# Patient Record
Sex: FEMALE | Race: WHITE | NOT HISPANIC OR LATINO | URBAN - METROPOLITAN AREA
[De-identification: names, ages, dates, MRNs, and addresses within clinical notes are randomized per-mention and may not be internally consistent; named-entity substitution may affect disease eponyms.]

---

## 2017-01-12 ENCOUNTER — COMMUNICATION - HEALTHEAST (OUTPATIENT)
Dept: INTERNAL MEDICINE | Facility: CLINIC | Age: 82
End: 2017-01-12

## 2017-05-26 ENCOUNTER — RECORDS - HEALTHEAST (OUTPATIENT)
Dept: ADMINISTRATIVE | Facility: OTHER | Age: 82
End: 2017-05-26

## 2017-08-21 ENCOUNTER — COMMUNICATION - HEALTHEAST (OUTPATIENT)
Dept: INTERNAL MEDICINE | Facility: CLINIC | Age: 82
End: 2017-08-21

## 2017-08-22 ENCOUNTER — OFFICE VISIT - HEALTHEAST (OUTPATIENT)
Dept: INTERNAL MEDICINE | Facility: CLINIC | Age: 82
End: 2017-08-22

## 2017-08-22 DIAGNOSIS — J02.9 ACUTE PHARYNGITIS, UNSPECIFIED ETIOLOGY: ICD-10-CM

## 2017-08-22 ASSESSMENT — MIFFLIN-ST. JEOR: SCORE: 734.74

## 2017-12-21 ENCOUNTER — RECORDS - HEALTHEAST (OUTPATIENT)
Dept: ADMINISTRATIVE | Facility: OTHER | Age: 82
End: 2017-12-21

## 2018-06-07 ENCOUNTER — RECORDS - HEALTHEAST (OUTPATIENT)
Dept: GENERAL RADIOLOGY | Facility: CLINIC | Age: 83
End: 2018-06-07

## 2018-06-07 ENCOUNTER — OFFICE VISIT - HEALTHEAST (OUTPATIENT)
Dept: FAMILY MEDICINE | Facility: CLINIC | Age: 83
End: 2018-06-07

## 2018-06-07 DIAGNOSIS — M25.571 RIGHT ANKLE PAIN: ICD-10-CM

## 2018-06-07 DIAGNOSIS — M25.571 PAIN IN RIGHT ANKLE AND JOINTS OF RIGHT FOOT: ICD-10-CM

## 2018-06-07 DIAGNOSIS — R09.A2 GLOBUS SENSATION: ICD-10-CM

## 2018-06-13 ENCOUNTER — RECORDS - HEALTHEAST (OUTPATIENT)
Dept: ADMINISTRATIVE | Facility: OTHER | Age: 83
End: 2018-06-13

## 2018-06-19 ENCOUNTER — RECORDS - HEALTHEAST (OUTPATIENT)
Dept: ADMINISTRATIVE | Facility: OTHER | Age: 83
End: 2018-06-19

## 2018-06-28 ENCOUNTER — AMBULATORY - HEALTHEAST (OUTPATIENT)
Dept: INTERNAL MEDICINE | Facility: CLINIC | Age: 83
End: 2018-06-28

## 2018-06-28 ENCOUNTER — COMMUNICATION - HEALTHEAST (OUTPATIENT)
Dept: INTERNAL MEDICINE | Facility: CLINIC | Age: 83
End: 2018-06-28

## 2018-06-28 DIAGNOSIS — Z86.73 HISTORY OF TIA (TRANSIENT ISCHEMIC ATTACK) AND STROKE: ICD-10-CM

## 2018-07-03 ENCOUNTER — RECORDS - HEALTHEAST (OUTPATIENT)
Dept: ADMINISTRATIVE | Facility: OTHER | Age: 83
End: 2018-07-03

## 2018-07-07 ENCOUNTER — COMMUNICATION - HEALTHEAST (OUTPATIENT)
Dept: INTERNAL MEDICINE | Facility: CLINIC | Age: 83
End: 2018-07-07

## 2018-07-18 ENCOUNTER — RECORDS - HEALTHEAST (OUTPATIENT)
Dept: ADMINISTRATIVE | Facility: OTHER | Age: 83
End: 2018-07-18

## 2018-07-19 ENCOUNTER — RECORDS - HEALTHEAST (OUTPATIENT)
Dept: ADMINISTRATIVE | Facility: OTHER | Age: 83
End: 2018-07-19

## 2018-07-30 ENCOUNTER — RECORDS - HEALTHEAST (OUTPATIENT)
Dept: ADMINISTRATIVE | Facility: OTHER | Age: 83
End: 2018-07-30

## 2018-08-06 ENCOUNTER — COMMUNICATION - HEALTHEAST (OUTPATIENT)
Dept: INTERNAL MEDICINE | Facility: CLINIC | Age: 83
End: 2018-08-06

## 2018-08-08 ENCOUNTER — OFFICE VISIT - HEALTHEAST (OUTPATIENT)
Dept: INTERNAL MEDICINE | Facility: CLINIC | Age: 83
End: 2018-08-08

## 2018-08-08 DIAGNOSIS — G43.109 MIGRAINE WITH AURA AND WITHOUT STATUS MIGRAINOSUS, NOT INTRACTABLE: ICD-10-CM

## 2018-08-08 ASSESSMENT — MIFFLIN-ST. JEOR: SCORE: 768.76

## 2018-09-04 ENCOUNTER — RECORDS - HEALTHEAST (OUTPATIENT)
Dept: ADMINISTRATIVE | Facility: OTHER | Age: 83
End: 2018-09-04

## 2019-05-30 ENCOUNTER — RECORDS - HEALTHEAST (OUTPATIENT)
Dept: ADMINISTRATIVE | Facility: OTHER | Age: 84
End: 2019-05-30

## 2019-06-06 ENCOUNTER — RECORDS - HEALTHEAST (OUTPATIENT)
Dept: ADMINISTRATIVE | Facility: OTHER | Age: 84
End: 2019-06-06

## 2019-07-18 ENCOUNTER — RECORDS - HEALTHEAST (OUTPATIENT)
Dept: ADMINISTRATIVE | Facility: OTHER | Age: 84
End: 2019-07-18

## 2019-08-01 ENCOUNTER — RECORDS - HEALTHEAST (OUTPATIENT)
Dept: ADMINISTRATIVE | Facility: OTHER | Age: 84
End: 2019-08-01

## 2019-11-29 ENCOUNTER — COMMUNICATION - HEALTHEAST (OUTPATIENT)
Dept: SCHEDULING | Facility: CLINIC | Age: 84
End: 2019-11-29

## 2019-12-02 ENCOUNTER — DOCUMENTATION ONLY (OUTPATIENT)
Dept: OTHER | Facility: CLINIC | Age: 84
End: 2019-12-02

## 2019-12-02 ENCOUNTER — AMBULATORY - HEALTHEAST (OUTPATIENT)
Dept: OTHER | Facility: CLINIC | Age: 84
End: 2019-12-02

## 2019-12-03 ENCOUNTER — COMMUNICATION - HEALTHEAST (OUTPATIENT)
Dept: INTERNAL MEDICINE | Facility: CLINIC | Age: 84
End: 2019-12-03

## 2019-12-06 ENCOUNTER — OFFICE VISIT - HEALTHEAST (OUTPATIENT)
Dept: INTERNAL MEDICINE | Facility: CLINIC | Age: 84
End: 2019-12-06

## 2019-12-06 DIAGNOSIS — A04.72 COLITIS DUE TO CLOSTRIDIUM DIFFICILE: ICD-10-CM

## 2019-12-06 ASSESSMENT — MIFFLIN-ST. JEOR: SCORE: 768.76

## 2019-12-20 ENCOUNTER — COMMUNICATION - HEALTHEAST (OUTPATIENT)
Dept: SCHEDULING | Facility: CLINIC | Age: 84
End: 2019-12-20

## 2019-12-20 ENCOUNTER — RECORDS - HEALTHEAST (OUTPATIENT)
Dept: SCHEDULING | Facility: CLINIC | Age: 84
End: 2019-12-20

## 2019-12-20 ENCOUNTER — AMBULATORY - HEALTHEAST (OUTPATIENT)
Dept: INTERNAL MEDICINE | Facility: CLINIC | Age: 84
End: 2019-12-20

## 2019-12-20 ENCOUNTER — COMMUNICATION - HEALTHEAST (OUTPATIENT)
Dept: INTERNAL MEDICINE | Facility: CLINIC | Age: 84
End: 2019-12-20

## 2019-12-20 DIAGNOSIS — Z00.00 ROUTINE GENERAL MEDICAL EXAMINATION AT A HEALTH CARE FACILITY: ICD-10-CM

## 2019-12-23 ENCOUNTER — AMBULATORY - HEALTHEAST (OUTPATIENT)
Dept: LAB | Facility: CLINIC | Age: 84
End: 2019-12-23

## 2019-12-23 ENCOUNTER — COMMUNICATION - HEALTHEAST (OUTPATIENT)
Dept: SCHEDULING | Facility: CLINIC | Age: 84
End: 2019-12-23

## 2019-12-23 DIAGNOSIS — Z00.00 ROUTINE GENERAL MEDICAL EXAMINATION AT A HEALTH CARE FACILITY: ICD-10-CM

## 2019-12-23 LAB
C DIFF TOX B STL QL: NEGATIVE
RIBOTYPE 027/NAP1/BI: NORMAL

## 2019-12-24 ENCOUNTER — COMMUNICATION - HEALTHEAST (OUTPATIENT)
Dept: INTERNAL MEDICINE | Facility: CLINIC | Age: 84
End: 2019-12-24

## 2020-02-03 ENCOUNTER — OFFICE VISIT - HEALTHEAST (OUTPATIENT)
Dept: INTERNAL MEDICINE | Facility: CLINIC | Age: 85
End: 2020-02-03

## 2020-02-03 DIAGNOSIS — R53.81 DECLINING FUNCTIONAL STATUS: ICD-10-CM

## 2020-02-03 DIAGNOSIS — R63.6 UNDERWEIGHT: ICD-10-CM

## 2020-02-03 DIAGNOSIS — R35.0 URINARY FREQUENCY: ICD-10-CM

## 2020-02-03 DIAGNOSIS — N95.2 ATROPHIC VAGINITIS: ICD-10-CM

## 2020-02-03 DIAGNOSIS — Z91.89 DRIVING SAFETY ISSUE: ICD-10-CM

## 2020-02-03 LAB
ALBUMIN UR-MCNC: NEGATIVE MG/DL
APPEARANCE UR: ABNORMAL
BACTERIA #/AREA URNS HPF: ABNORMAL HPF
BILIRUB UR QL STRIP: NEGATIVE
COLOR UR AUTO: YELLOW
GLUCOSE UR STRIP-MCNC: NEGATIVE MG/DL
HGB UR QL STRIP: ABNORMAL
KETONES UR STRIP-MCNC: NEGATIVE MG/DL
LEUKOCYTE ESTERASE UR QL STRIP: NEGATIVE
NITRATE UR QL: NEGATIVE
PH UR STRIP: 5.5 [PH] (ref 5–8)
RBC #/AREA URNS AUTO: ABNORMAL HPF
SP GR UR STRIP: 1.02 (ref 1–1.03)
SQUAMOUS #/AREA URNS AUTO: ABNORMAL LPF
UROBILINOGEN UR STRIP-ACNC: ABNORMAL
WBC #/AREA URNS AUTO: ABNORMAL HPF

## 2020-02-06 ENCOUNTER — COMMUNICATION - HEALTHEAST (OUTPATIENT)
Dept: INTERNAL MEDICINE | Facility: CLINIC | Age: 85
End: 2020-02-06

## 2020-02-07 ENCOUNTER — OFFICE VISIT - HEALTHEAST (OUTPATIENT)
Dept: INTERNAL MEDICINE | Facility: CLINIC | Age: 85
End: 2020-02-07

## 2020-02-07 DIAGNOSIS — Z86.39 HX OF HYPERLIPIDEMIA: ICD-10-CM

## 2020-02-07 DIAGNOSIS — R63.6 UNDERWEIGHT: ICD-10-CM

## 2020-02-07 DIAGNOSIS — R44.1 VISUAL HALLUCINATIONS: ICD-10-CM

## 2020-02-07 DIAGNOSIS — Z02.2 ENCOUNTER FOR EXAMINATION FOR ADMISSION TO ASSISTED LIVING FACILITY: ICD-10-CM

## 2020-02-10 ENCOUNTER — AMBULATORY - HEALTHEAST (OUTPATIENT)
Dept: NURSING | Facility: CLINIC | Age: 85
End: 2020-02-10

## 2020-02-12 ENCOUNTER — COMMUNICATION - HEALTHEAST (OUTPATIENT)
Dept: INTERNAL MEDICINE | Facility: CLINIC | Age: 85
End: 2020-02-12

## 2020-02-18 ENCOUNTER — COMMUNICATION - HEALTHEAST (OUTPATIENT)
Dept: SCHEDULING | Facility: CLINIC | Age: 85
End: 2020-02-18

## 2020-02-19 ENCOUNTER — COMMUNICATION - HEALTHEAST (OUTPATIENT)
Dept: INTERNAL MEDICINE | Facility: CLINIC | Age: 85
End: 2020-02-19

## 2020-02-19 DIAGNOSIS — N30.01 ACUTE CYSTITIS WITH HEMATURIA: ICD-10-CM

## 2020-02-25 ENCOUNTER — COMMUNICATION - HEALTHEAST (OUTPATIENT)
Dept: INTERNAL MEDICINE | Facility: CLINIC | Age: 85
End: 2020-02-25

## 2020-03-01 ENCOUNTER — COMMUNICATION - HEALTHEAST (OUTPATIENT)
Dept: INTERNAL MEDICINE | Facility: CLINIC | Age: 85
End: 2020-03-01

## 2020-03-03 ENCOUNTER — COMMUNICATION - HEALTHEAST (OUTPATIENT)
Dept: INTERNAL MEDICINE | Facility: CLINIC | Age: 85
End: 2020-03-03

## 2020-03-06 ENCOUNTER — HOME CARE/HOSPICE - HEALTHEAST (OUTPATIENT)
Dept: HOME HEALTH SERVICES | Facility: HOME HEALTH | Age: 85
End: 2020-03-06

## 2020-03-06 ENCOUNTER — OFFICE VISIT - HEALTHEAST (OUTPATIENT)
Dept: INTERNAL MEDICINE | Facility: CLINIC | Age: 85
End: 2020-03-06

## 2020-03-06 DIAGNOSIS — Z87.440 HISTORY OF RECURRENT UTIS: ICD-10-CM

## 2020-03-06 DIAGNOSIS — R44.1 VISUAL HALLUCINATIONS: ICD-10-CM

## 2020-03-06 DIAGNOSIS — M85.80 OSTEOPENIA, UNSPECIFIED LOCATION: ICD-10-CM

## 2020-03-06 DIAGNOSIS — M77.51 RIGHT ANKLE TENDONITIS: ICD-10-CM

## 2020-03-06 DIAGNOSIS — N95.2 ATROPHIC VAGINITIS: ICD-10-CM

## 2020-03-13 ENCOUNTER — COMMUNICATION - HEALTHEAST (OUTPATIENT)
Dept: INTERNAL MEDICINE | Facility: CLINIC | Age: 85
End: 2020-03-13

## 2020-03-13 ENCOUNTER — OFFICE VISIT - HEALTHEAST (OUTPATIENT)
Dept: INTERNAL MEDICINE | Facility: CLINIC | Age: 85
End: 2020-03-13

## 2020-03-13 DIAGNOSIS — N95.2 ATROPHIC VAGINITIS: ICD-10-CM

## 2020-03-13 DIAGNOSIS — R44.1 VISUAL HALLUCINATIONS: ICD-10-CM

## 2020-03-13 DIAGNOSIS — F03.90 MAJOR NEUROCOGNITIVE DISORDER (H): ICD-10-CM

## 2020-03-13 ASSESSMENT — PATIENT HEALTH QUESTIONNAIRE - PHQ9: SUM OF ALL RESPONSES TO PHQ QUESTIONS 1-9: 0

## 2020-03-16 ENCOUNTER — DOCUMENTATION ONLY (OUTPATIENT)
Dept: OTHER | Facility: CLINIC | Age: 85
End: 2020-03-16

## 2020-03-16 ENCOUNTER — AMBULATORY - HEALTHEAST (OUTPATIENT)
Dept: OTHER | Facility: CLINIC | Age: 85
End: 2020-03-16

## 2020-03-16 ENCOUNTER — COMMUNICATION - HEALTHEAST (OUTPATIENT)
Dept: INTERNAL MEDICINE | Facility: CLINIC | Age: 85
End: 2020-03-16

## 2020-03-17 ENCOUNTER — RECORDS - HEALTHEAST (OUTPATIENT)
Dept: ADMINISTRATIVE | Facility: OTHER | Age: 85
End: 2020-03-17

## 2020-03-25 ENCOUNTER — AMBULATORY - HEALTHEAST (OUTPATIENT)
Dept: INTERNAL MEDICINE | Facility: CLINIC | Age: 85
End: 2020-03-25

## 2020-03-25 DIAGNOSIS — G31.83 LEWY BODY DEMENTIA WITHOUT BEHAVIORAL DISTURBANCE (H): ICD-10-CM

## 2020-03-25 DIAGNOSIS — F02.80 LEWY BODY DEMENTIA WITHOUT BEHAVIORAL DISTURBANCE (H): ICD-10-CM

## 2020-03-25 DIAGNOSIS — R44.1 VISUAL HALLUCINATIONS: ICD-10-CM

## 2020-04-02 ENCOUNTER — RECORDS - HEALTHEAST (OUTPATIENT)
Dept: ADMINISTRATIVE | Facility: OTHER | Age: 85
End: 2020-04-02

## 2020-04-08 ENCOUNTER — COMMUNICATION - HEALTHEAST (OUTPATIENT)
Dept: INTERNAL MEDICINE | Facility: CLINIC | Age: 85
End: 2020-04-08

## 2020-04-10 ENCOUNTER — COMMUNICATION - HEALTHEAST (OUTPATIENT)
Dept: INTERNAL MEDICINE | Facility: CLINIC | Age: 85
End: 2020-04-10

## 2020-04-10 DIAGNOSIS — R30.0 DYSURIA: ICD-10-CM

## 2020-04-29 ENCOUNTER — COMMUNICATION - HEALTHEAST (OUTPATIENT)
Dept: INTERNAL MEDICINE | Facility: CLINIC | Age: 85
End: 2020-04-29

## 2020-04-29 ENCOUNTER — AMBULATORY - HEALTHEAST (OUTPATIENT)
Dept: INTERNAL MEDICINE | Facility: CLINIC | Age: 85
End: 2020-04-29

## 2020-04-29 DIAGNOSIS — R30.0 DYSURIA: ICD-10-CM

## 2020-04-29 DIAGNOSIS — N95.2 ATROPHIC VAGINITIS: ICD-10-CM

## 2020-05-06 ENCOUNTER — COMMUNICATION - HEALTHEAST (OUTPATIENT)
Dept: INTERNAL MEDICINE | Facility: CLINIC | Age: 85
End: 2020-05-06

## 2020-05-07 ENCOUNTER — RECORDS - HEALTHEAST (OUTPATIENT)
Dept: LAB | Facility: CLINIC | Age: 85
End: 2020-05-07

## 2020-05-07 ENCOUNTER — RECORDS - HEALTHEAST (OUTPATIENT)
Dept: ADMINISTRATIVE | Facility: OTHER | Age: 85
End: 2020-05-07

## 2020-05-07 LAB
ALBUMIN UR-MCNC: NEGATIVE MG/DL
APPEARANCE UR: CLEAR
BACTERIA #/AREA URNS HPF: ABNORMAL HPF
BILIRUB UR QL STRIP: NEGATIVE
COLOR UR AUTO: YELLOW
GLUCOSE UR STRIP-MCNC: NEGATIVE MG/DL
HGB UR QL STRIP: NEGATIVE
KETONES UR STRIP-MCNC: NEGATIVE MG/DL
LEUKOCYTE ESTERASE UR QL STRIP: ABNORMAL
MUCOUS THREADS #/AREA URNS LPF: ABNORMAL LPF
NITRATE UR QL: NEGATIVE
PH UR STRIP: 6 [PH] (ref 4.5–8)
RBC #/AREA URNS AUTO: ABNORMAL HPF
SP GR UR STRIP: 1.01 (ref 1–1.03)
SQUAMOUS #/AREA URNS AUTO: ABNORMAL LPF
UROBILINOGEN UR STRIP-ACNC: ABNORMAL
WBC #/AREA URNS AUTO: ABNORMAL HPF
WBC CLUMPS #/AREA URNS HPF: PRESENT /[HPF]

## 2020-05-08 ENCOUNTER — AMBULATORY - HEALTHEAST (OUTPATIENT)
Dept: INTERNAL MEDICINE | Facility: CLINIC | Age: 85
End: 2020-05-08

## 2020-05-08 ENCOUNTER — COMMUNICATION - HEALTHEAST (OUTPATIENT)
Dept: INTERNAL MEDICINE | Facility: CLINIC | Age: 85
End: 2020-05-08

## 2020-05-08 DIAGNOSIS — R30.0 DYSURIA: ICD-10-CM

## 2020-05-09 LAB — BACTERIA SPEC CULT: ABNORMAL

## 2020-05-13 ENCOUNTER — RECORDS - HEALTHEAST (OUTPATIENT)
Dept: LAB | Facility: CLINIC | Age: 85
End: 2020-05-13

## 2020-05-13 LAB
ANION GAP SERPL CALCULATED.3IONS-SCNC: 7 MMOL/L (ref 5–18)
BUN SERPL-MCNC: 15 MG/DL (ref 8–28)
CALCIUM SERPL-MCNC: 9.3 MG/DL (ref 8.5–10.5)
CHLORIDE BLD-SCNC: 102 MMOL/L (ref 98–107)
CO2 SERPL-SCNC: 31 MMOL/L (ref 22–31)
CREAT SERPL-MCNC: 0.84 MG/DL (ref 0.6–1.1)
ERYTHROCYTE [DISTWIDTH] IN BLOOD BY AUTOMATED COUNT: 13 % (ref 11–14.5)
GFR SERPL CREATININE-BSD FRML MDRD: >60 ML/MIN/1.73M2
GLUCOSE BLD-MCNC: 78 MG/DL (ref 70–125)
HCT VFR BLD AUTO: 39.8 % (ref 35–47)
HGB BLD-MCNC: 12.4 G/DL (ref 12–16)
MCH RBC QN AUTO: 29.3 PG (ref 27–34)
MCHC RBC AUTO-ENTMCNC: 31.2 G/DL (ref 32–36)
MCV RBC AUTO: 94 FL (ref 80–100)
PLATELET # BLD AUTO: 248 THOU/UL (ref 140–440)
PMV BLD AUTO: 11.1 FL (ref 8.5–12.5)
POTASSIUM BLD-SCNC: 4.2 MMOL/L (ref 3.5–5)
RBC # BLD AUTO: 4.23 MILL/UL (ref 3.8–5.4)
SODIUM SERPL-SCNC: 140 MMOL/L (ref 136–145)
TSH SERPL DL<=0.005 MIU/L-ACNC: 3.2 UIU/ML (ref 0.3–5)
VIT B12 SERPL-MCNC: 420 PG/ML (ref 213–816)
WBC: 8 THOU/UL (ref 4–11)

## 2020-05-15 LAB — 25(OH)D3 SERPL-MCNC: 46.7 NG/ML (ref 30–80)

## 2020-07-08 ENCOUNTER — TRANSFERRED RECORDS (OUTPATIENT)
Dept: HEALTH INFORMATION MANAGEMENT | Facility: CLINIC | Age: 85
End: 2020-07-08

## 2020-07-21 ENCOUNTER — COMMUNICATION - HEALTHEAST (OUTPATIENT)
Dept: INTERNAL MEDICINE | Facility: CLINIC | Age: 85
End: 2020-07-21

## 2020-07-28 ENCOUNTER — COMMUNICATION - HEALTHEAST (OUTPATIENT)
Dept: INTERNAL MEDICINE | Facility: CLINIC | Age: 85
End: 2020-07-28

## 2020-07-28 ENCOUNTER — OFFICE VISIT - HEALTHEAST (OUTPATIENT)
Dept: INTERNAL MEDICINE | Facility: CLINIC | Age: 85
End: 2020-07-28

## 2020-07-28 DIAGNOSIS — N32.89 OTHER SPECIFIED DISORDERS OF BLADDER: ICD-10-CM

## 2020-07-28 DIAGNOSIS — M77.51 RIGHT ANKLE TENDONITIS: ICD-10-CM

## 2020-07-28 DIAGNOSIS — Z01.818 PREOP GENERAL PHYSICAL EXAM: ICD-10-CM

## 2020-07-28 LAB
ATRIAL RATE - MUSE: 81 BPM
DIASTOLIC BLOOD PRESSURE - MUSE: NORMAL
INTERPRETATION ECG - MUSE: NORMAL
P AXIS - MUSE: 66 DEGREES
PR INTERVAL - MUSE: 120 MS
QRS DURATION - MUSE: 74 MS
QT - MUSE: 372 MS
QTC - MUSE: 432 MS
R AXIS - MUSE: -24 DEGREES
SYSTOLIC BLOOD PRESSURE - MUSE: NORMAL
T AXIS - MUSE: 12 DEGREES
VENTRICULAR RATE- MUSE: 81 BPM

## 2020-07-31 ENCOUNTER — AMBULATORY - HEALTHEAST (OUTPATIENT)
Dept: CARE COORDINATION | Facility: CLINIC | Age: 85
End: 2020-07-31

## 2020-07-31 ENCOUNTER — COMMUNICATION - HEALTHEAST (OUTPATIENT)
Dept: NURSING | Facility: CLINIC | Age: 85
End: 2020-07-31

## 2020-07-31 DIAGNOSIS — Z86.39 HX OF HYPERLIPIDEMIA: ICD-10-CM

## 2020-09-09 ENCOUNTER — TRANSFERRED RECORDS (OUTPATIENT)
Dept: HEALTH INFORMATION MANAGEMENT | Facility: CLINIC | Age: 85
End: 2020-09-09

## 2020-09-09 ENCOUNTER — RECORDS - HEALTHEAST (OUTPATIENT)
Dept: ADMINISTRATIVE | Facility: OTHER | Age: 85
End: 2020-09-09

## 2020-10-07 ENCOUNTER — COMMUNICATION - HEALTHEAST (OUTPATIENT)
Dept: INTERNAL MEDICINE | Facility: CLINIC | Age: 85
End: 2020-10-07

## 2020-12-11 ENCOUNTER — COMMUNICATION - HEALTHEAST (OUTPATIENT)
Dept: INTERNAL MEDICINE | Facility: CLINIC | Age: 85
End: 2020-12-11

## 2021-01-29 ENCOUNTER — COMMUNICATION - HEALTHEAST (OUTPATIENT)
Dept: INTERNAL MEDICINE | Facility: CLINIC | Age: 86
End: 2021-01-29

## 2021-02-11 ENCOUNTER — COMMUNICATION - HEALTHEAST (OUTPATIENT)
Dept: FAMILY MEDICINE | Facility: CLINIC | Age: 86
End: 2021-02-11

## 2021-03-01 ENCOUNTER — RECORDS - HEALTHEAST (OUTPATIENT)
Dept: ADMINISTRATIVE | Facility: OTHER | Age: 86
End: 2021-03-01

## 2021-03-09 ENCOUNTER — RECORDS - HEALTHEAST (OUTPATIENT)
Dept: ADMINISTRATIVE | Facility: OTHER | Age: 86
End: 2021-03-09

## 2021-03-16 ENCOUNTER — DOCUMENTATION ONLY (OUTPATIENT)
Dept: OTHER | Facility: CLINIC | Age: 86
End: 2021-03-16

## 2021-03-16 ENCOUNTER — AMBULATORY - HEALTHEAST (OUTPATIENT)
Dept: OTHER | Facility: CLINIC | Age: 86
End: 2021-03-16

## 2021-03-22 ENCOUNTER — OFFICE VISIT - HEALTHEAST (OUTPATIENT)
Dept: INTERNAL MEDICINE | Facility: CLINIC | Age: 86
End: 2021-03-22

## 2021-03-22 DIAGNOSIS — H53.452 ABNORMAL PERIPHERAL VISION OF LEFT EYE: ICD-10-CM

## 2021-03-22 DIAGNOSIS — R00.2 HEART PALPITATIONS: ICD-10-CM

## 2021-03-22 DIAGNOSIS — M85.80 OSTEOPENIA, UNSPECIFIED LOCATION: ICD-10-CM

## 2021-03-22 DIAGNOSIS — G89.29 CHRONIC PAIN OF RIGHT ANKLE: ICD-10-CM

## 2021-03-22 DIAGNOSIS — R41.89 IMPAIRMENT OF COGNITIVE FUNCTION: ICD-10-CM

## 2021-03-22 DIAGNOSIS — M25.571 CHRONIC PAIN OF RIGHT ANKLE: ICD-10-CM

## 2021-03-22 LAB
ATRIAL RATE - MUSE: 76 BPM
DIASTOLIC BLOOD PRESSURE - MUSE: NORMAL
INTERPRETATION ECG - MUSE: NORMAL
P AXIS - MUSE: 64 DEGREES
PR INTERVAL - MUSE: 124 MS
QRS DURATION - MUSE: 72 MS
QT - MUSE: 364 MS
QTC - MUSE: 409 MS
R AXIS - MUSE: -26 DEGREES
SYSTOLIC BLOOD PRESSURE - MUSE: NORMAL
T AXIS - MUSE: 8 DEGREES
VENTRICULAR RATE- MUSE: 76 BPM

## 2021-03-22 ASSESSMENT — MIFFLIN-ST. JEOR: SCORE: 805.05

## 2021-05-05 ENCOUNTER — OFFICE VISIT - HEALTHEAST (OUTPATIENT)
Dept: INTERNAL MEDICINE | Facility: CLINIC | Age: 86
End: 2021-05-05

## 2021-05-05 DIAGNOSIS — D64.89 ANEMIA DUE TO OTHER CAUSE, NOT CLASSIFIED: ICD-10-CM

## 2021-05-05 DIAGNOSIS — R10.84 ABDOMINAL PAIN, GENERALIZED: ICD-10-CM

## 2021-05-05 DIAGNOSIS — G89.29 CHRONIC PAIN OF RIGHT ANKLE: ICD-10-CM

## 2021-05-05 DIAGNOSIS — M19.071 PRIMARY OSTEOARTHRITIS OF RIGHT ANKLE: ICD-10-CM

## 2021-05-05 DIAGNOSIS — Z87.11 HISTORY OF GASTRIC ULCER: ICD-10-CM

## 2021-05-05 DIAGNOSIS — R53.83 OTHER FATIGUE: ICD-10-CM

## 2021-05-05 DIAGNOSIS — R79.9 ABNORMAL FINDING OF BLOOD CHEMISTRY, UNSPECIFIED: ICD-10-CM

## 2021-05-05 DIAGNOSIS — M25.571 CHRONIC PAIN OF RIGHT ANKLE: ICD-10-CM

## 2021-05-05 DIAGNOSIS — K21.9 GASTROESOPHAGEAL REFLUX DISEASE, UNSPECIFIED WHETHER ESOPHAGITIS PRESENT: ICD-10-CM

## 2021-05-05 DIAGNOSIS — Z13.220 LIPID SCREENING: ICD-10-CM

## 2021-05-05 LAB
ALBUMIN SERPL-MCNC: 3.6 G/DL (ref 3.5–5)
ALP SERPL-CCNC: 73 U/L (ref 45–120)
ALT SERPL W P-5'-P-CCNC: 11 U/L (ref 0–45)
ANION GAP SERPL CALCULATED.3IONS-SCNC: 11 MMOL/L (ref 5–18)
AST SERPL W P-5'-P-CCNC: 18 U/L (ref 0–40)
BASOPHILS # BLD AUTO: 0 THOU/UL (ref 0–0.2)
BASOPHILS NFR BLD AUTO: 1 % (ref 0–2)
BILIRUB DIRECT SERPL-MCNC: 0.2 MG/DL
BILIRUB SERPL-MCNC: 0.5 MG/DL (ref 0–1)
BUN SERPL-MCNC: 26 MG/DL (ref 8–28)
CALCIUM SERPL-MCNC: 9.2 MG/DL (ref 8.5–10.5)
CHLORIDE BLD-SCNC: 103 MMOL/L (ref 98–107)
CO2 SERPL-SCNC: 26 MMOL/L (ref 22–31)
CREAT SERPL-MCNC: 1.02 MG/DL (ref 0.6–1.1)
EOSINOPHIL # BLD AUTO: 0.2 THOU/UL (ref 0–0.4)
EOSINOPHIL NFR BLD AUTO: 3 % (ref 0–6)
ERYTHROCYTE [DISTWIDTH] IN BLOOD BY AUTOMATED COUNT: 12 % (ref 11–14.5)
GFR SERPL CREATININE-BSD FRML MDRD: 51 ML/MIN/1.73M2
GLUCOSE BLD-MCNC: 84 MG/DL (ref 70–125)
HCT VFR BLD AUTO: 33.7 % (ref 35–47)
HGB BLD-MCNC: 10.9 G/DL (ref 12–16)
IMM GRANULOCYTES # BLD: 0 THOU/UL
IMM GRANULOCYTES NFR BLD: 0 %
LDLC SERPL CALC-MCNC: 174 MG/DL
LYMPHOCYTES # BLD AUTO: 2.2 THOU/UL (ref 0.8–4.4)
LYMPHOCYTES NFR BLD AUTO: 33 % (ref 20–40)
MCH RBC QN AUTO: 30.4 PG (ref 27–34)
MCHC RBC AUTO-ENTMCNC: 32.3 G/DL (ref 32–36)
MCV RBC AUTO: 94 FL (ref 80–100)
MONOCYTES # BLD AUTO: 0.7 THOU/UL (ref 0–0.9)
MONOCYTES NFR BLD AUTO: 10 % (ref 2–10)
NEUTROPHILS # BLD AUTO: 3.5 THOU/UL (ref 2–7.7)
NEUTROPHILS NFR BLD AUTO: 53 % (ref 50–70)
PLATELET # BLD AUTO: 270 THOU/UL (ref 140–440)
PMV BLD AUTO: 9.3 FL (ref 7–10)
POTASSIUM BLD-SCNC: 4.2 MMOL/L (ref 3.5–5)
PROT SERPL-MCNC: 6.8 G/DL (ref 6–8)
RBC # BLD AUTO: 3.58 MILL/UL (ref 3.8–5.4)
SODIUM SERPL-SCNC: 140 MMOL/L (ref 136–145)
WBC: 6.6 THOU/UL (ref 4–11)

## 2021-05-05 ASSESSMENT — MIFFLIN-ST. JEOR: SCORE: 784.63

## 2021-05-07 ENCOUNTER — COMMUNICATION - HEALTHEAST (OUTPATIENT)
Dept: INTERNAL MEDICINE | Facility: CLINIC | Age: 86
End: 2021-05-07

## 2021-05-10 ENCOUNTER — COMMUNICATION - HEALTHEAST (OUTPATIENT)
Dept: INTERNAL MEDICINE | Facility: CLINIC | Age: 86
End: 2021-05-10
Payer: COMMERCIAL

## 2021-05-14 ENCOUNTER — COMMUNICATION - HEALTHEAST (OUTPATIENT)
Dept: INTERNAL MEDICINE | Facility: CLINIC | Age: 86
End: 2021-05-14

## 2021-05-14 ENCOUNTER — TRANSFERRED RECORDS (OUTPATIENT)
Dept: HEALTH INFORMATION MANAGEMENT | Facility: CLINIC | Age: 86
End: 2021-05-14

## 2021-05-16 ENCOUNTER — COMMUNICATION - HEALTHEAST (OUTPATIENT)
Dept: INTERNAL MEDICINE | Facility: CLINIC | Age: 86
End: 2021-05-16

## 2021-05-17 ENCOUNTER — COMMUNICATION - HEALTHEAST (OUTPATIENT)
Dept: INTERNAL MEDICINE | Facility: CLINIC | Age: 86
End: 2021-05-17

## 2021-05-17 ENCOUNTER — AMBULATORY - HEALTHEAST (OUTPATIENT)
Dept: SURGERY | Facility: CLINIC | Age: 86
End: 2021-05-17

## 2021-05-17 ENCOUNTER — OFFICE VISIT - HEALTHEAST (OUTPATIENT)
Dept: INTERNAL MEDICINE | Facility: CLINIC | Age: 86
End: 2021-05-17

## 2021-05-17 DIAGNOSIS — Z01.818 PRE-OPERATIVE GENERAL PHYSICAL EXAMINATION: ICD-10-CM

## 2021-05-17 DIAGNOSIS — G89.29 CHRONIC LEFT-SIDED LOW BACK PAIN WITH LEFT-SIDED SCIATICA: ICD-10-CM

## 2021-05-17 DIAGNOSIS — Z11.59 ENCOUNTER FOR SCREENING FOR OTHER VIRAL DISEASES: ICD-10-CM

## 2021-05-17 DIAGNOSIS — N30.10 INTERSTITIAL CYSTITIS: ICD-10-CM

## 2021-05-17 DIAGNOSIS — R79.9 ABNORMAL FINDING OF BLOOD CHEMISTRY, UNSPECIFIED: ICD-10-CM

## 2021-05-17 DIAGNOSIS — M54.42 CHRONIC LEFT-SIDED LOW BACK PAIN WITH LEFT-SIDED SCIATICA: ICD-10-CM

## 2021-05-17 DIAGNOSIS — D64.89 ANEMIA DUE TO OTHER CAUSE, NOT CLASSIFIED: ICD-10-CM

## 2021-05-17 LAB
FERRITIN SERPL-MCNC: 21 NG/ML (ref 10–130)
FOLATE SERPL-MCNC: >20 NG/ML
IRON SATN MFR SERPL: 26 % (ref 20–50)
IRON SERPL-MCNC: 102 UG/DL (ref 42–175)
TIBC SERPL-MCNC: 385 UG/DL (ref 313–563)
TRANSFERRIN SERPL-MCNC: 308 MG/DL (ref 212–360)
TRANSFERRIN SERPL-MCNC: 308 MG/DL (ref 212–360)
VIT B12 SERPL-MCNC: 786 PG/ML (ref 213–816)

## 2021-05-17 ASSESSMENT — MIFFLIN-ST. JEOR: SCORE: 768.76

## 2021-05-18 LAB
SARS-COV-2 PCR COMMENT: NORMAL
SARS-COV-2 RNA SPEC QL NAA+PROBE: NEGATIVE
SARS-COV-2 VIRUS SPECIMEN SOURCE: NORMAL

## 2021-05-19 ENCOUNTER — COMMUNICATION - HEALTHEAST (OUTPATIENT)
Dept: SCHEDULING | Facility: CLINIC | Age: 86
End: 2021-05-19

## 2021-05-19 ENCOUNTER — COMMUNICATION - HEALTHEAST (OUTPATIENT)
Dept: INTERNAL MEDICINE | Facility: CLINIC | Age: 86
End: 2021-05-19
Payer: COMMERCIAL

## 2021-05-21 ENCOUNTER — ANESTHESIA - HEALTHEAST (OUTPATIENT)
Dept: SURGERY | Facility: CLINIC | Age: 86
End: 2021-05-21

## 2021-05-21 ENCOUNTER — HOSPITAL ENCOUNTER (OUTPATIENT)
Dept: SURGERY | Facility: CLINIC | Age: 86
Discharge: HOME | End: 2021-05-21
Attending: UROLOGY | Admitting: UROLOGY
Payer: COMMERCIAL

## 2021-05-21 ENCOUNTER — SURGERY - HEALTHEAST (OUTPATIENT)
Dept: SURGERY | Facility: CLINIC | Age: 86
End: 2021-05-21

## 2021-05-21 ASSESSMENT — MIFFLIN-ST. JEOR
SCORE: 770.12
SCORE: 770.12

## 2021-05-24 ENCOUNTER — RECORDS - HEALTHEAST (OUTPATIENT)
Dept: ADMINISTRATIVE | Facility: CLINIC | Age: 86
End: 2021-05-24

## 2021-05-25 ENCOUNTER — RECORDS - HEALTHEAST (OUTPATIENT)
Dept: ADMINISTRATIVE | Facility: CLINIC | Age: 86
End: 2021-05-25

## 2021-05-28 ENCOUNTER — RECORDS - HEALTHEAST (OUTPATIENT)
Dept: ADMINISTRATIVE | Facility: CLINIC | Age: 86
End: 2021-05-28

## 2021-05-30 ENCOUNTER — RECORDS - HEALTHEAST (OUTPATIENT)
Dept: ADMINISTRATIVE | Facility: CLINIC | Age: 86
End: 2021-05-30

## 2021-05-31 ENCOUNTER — RECORDS - HEALTHEAST (OUTPATIENT)
Dept: ADMINISTRATIVE | Facility: CLINIC | Age: 86
End: 2021-05-31

## 2021-06-02 ENCOUNTER — OFFICE VISIT - HEALTHEAST (OUTPATIENT)
Dept: INTERNAL MEDICINE | Facility: CLINIC | Age: 86
End: 2021-06-02

## 2021-06-02 DIAGNOSIS — M25.571 CHRONIC PAIN OF RIGHT ANKLE: ICD-10-CM

## 2021-06-02 DIAGNOSIS — R74.8 ABNORMAL LIVER ENZYMES: ICD-10-CM

## 2021-06-02 DIAGNOSIS — Z87.11 HISTORY OF PEPTIC ULCER DISEASE: ICD-10-CM

## 2021-06-02 DIAGNOSIS — E78.5 HYPERLIPIDEMIA LDL GOAL <70: ICD-10-CM

## 2021-06-02 DIAGNOSIS — G89.29 CHRONIC PAIN OF RIGHT ANKLE: ICD-10-CM

## 2021-06-02 DIAGNOSIS — E87.1 HYPONATREMIA: ICD-10-CM

## 2021-06-02 DIAGNOSIS — D64.89 ANEMIA DUE TO OTHER CAUSE, NOT CLASSIFIED: ICD-10-CM

## 2021-06-02 DIAGNOSIS — I25.10 CORONARY ARTERY DISEASE INVOLVING NATIVE CORONARY ARTERY OF NATIVE HEART WITHOUT ANGINA PECTORIS: ICD-10-CM

## 2021-06-02 DIAGNOSIS — Z86.73 HISTORY OF CVA (CEREBROVASCULAR ACCIDENT): ICD-10-CM

## 2021-06-02 ASSESSMENT — MIFFLIN-ST. JEOR: SCORE: 775.56

## 2021-06-04 ENCOUNTER — COMMUNICATION - HEALTHEAST (OUTPATIENT)
Dept: INTERNAL MEDICINE | Facility: CLINIC | Age: 86
End: 2021-06-04

## 2021-06-04 ENCOUNTER — COMMUNICATION - HEALTHEAST (OUTPATIENT)
Dept: ADMINISTRATIVE | Facility: CLINIC | Age: 86
End: 2021-06-04

## 2021-06-07 ENCOUNTER — COMMUNICATION - HEALTHEAST (OUTPATIENT)
Dept: ADMINISTRATIVE | Facility: CLINIC | Age: 86
End: 2021-06-07

## 2021-06-09 ENCOUNTER — COMMUNICATION - HEALTHEAST (OUTPATIENT)
Dept: INTERNAL MEDICINE | Facility: CLINIC | Age: 86
End: 2021-06-09

## 2021-06-11 ENCOUNTER — COMMUNICATION - HEALTHEAST (OUTPATIENT)
Dept: ADMINISTRATIVE | Facility: CLINIC | Age: 86
End: 2021-06-11

## 2021-06-13 ENCOUNTER — HEALTH MAINTENANCE LETTER (OUTPATIENT)
Age: 86
End: 2021-06-13

## 2021-06-16 ENCOUNTER — RECORDS - HEALTHEAST (OUTPATIENT)
Dept: ADMINISTRATIVE | Facility: OTHER | Age: 86
End: 2021-06-16

## 2021-06-24 DIAGNOSIS — Z53.9 DIAGNOSIS NOT YET DEFINED: Primary | ICD-10-CM

## 2021-06-30 ENCOUNTER — MEDICAL CORRESPONDENCE (OUTPATIENT)
Dept: HEALTH INFORMATION MANAGEMENT | Facility: CLINIC | Age: 86
End: 2021-06-30

## 2021-07-15 ENCOUNTER — TELEPHONE (OUTPATIENT)
Dept: INTERNAL MEDICINE | Facility: CLINIC | Age: 86
End: 2021-07-15

## 2021-07-21 ENCOUNTER — MEDICAL CORRESPONDENCE (OUTPATIENT)
Dept: HEALTH INFORMATION MANAGEMENT | Facility: CLINIC | Age: 86
End: 2021-07-21

## 2021-07-21 ENCOUNTER — RECORDS - HEALTHEAST (OUTPATIENT)
Dept: ADMINISTRATIVE | Facility: CLINIC | Age: 86
End: 2021-07-21

## 2021-07-23 VITALS
HEIGHT: 61 IN | BODY MASS INDEX: 17.95 KG/M2 | OXYGEN SATURATION: 97 % | OXYGEN SATURATION: 96 % | WEIGHT: 94 LBS | WEIGHT: 90 LBS | DIASTOLIC BLOOD PRESSURE: 66 MMHG | BODY MASS INDEX: 17.78 KG/M2 | OXYGEN SATURATION: 97 % | BODY MASS INDEX: 17.75 KG/M2 | WEIGHT: 99.08 LBS | HEIGHT: 60 IN | WEIGHT: 94 LBS | DIASTOLIC BLOOD PRESSURE: 62 MMHG | HEART RATE: 85 BPM | SYSTOLIC BLOOD PRESSURE: 134 MMHG | WEIGHT: 94.08 LBS | HEIGHT: 61 IN | SYSTOLIC BLOOD PRESSURE: 118 MMHG | HEART RATE: 71 BPM | SYSTOLIC BLOOD PRESSURE: 124 MMHG | SYSTOLIC BLOOD PRESSURE: 120 MMHG | WEIGHT: 95 LBS | BODY MASS INDEX: 17.67 KG/M2 | DIASTOLIC BLOOD PRESSURE: 60 MMHG | HEART RATE: 75 BPM | BODY MASS INDEX: 18.72 KG/M2 | OXYGEN SATURATION: 96 % | HEART RATE: 85 BPM | BODY MASS INDEX: 17.75 KG/M2 | DIASTOLIC BLOOD PRESSURE: 68 MMHG

## 2021-07-23 VITALS — BODY MASS INDEX: 19.2 KG/M2 | WEIGHT: 97.8 LBS | HEIGHT: 60 IN

## 2021-07-23 NOTE — TELEPHONE ENCOUNTER
Left message to call back for: response  Information to relay to patient:  See below  Yessenia Larry CSS

## 2021-07-23 NOTE — TELEPHONE ENCOUNTER
Spoke with Luisa from  and relayed message below from Dr. Lay regarding requested orders below.  She verbalized understanding and had no further questions at this time.  Ivonne BROOKS CMA/NATHAN....................5:02 PM

## 2021-07-23 NOTE — TELEPHONE ENCOUNTER
Called and Spoke to PT.  She declined making an appt, she will call her daughter, she did not know her daughter's schedule.

## 2021-07-23 NOTE — PROGRESS NOTES
[facility] Note  Hansa Ly   89 y.o. female    Date of Visit: 3/13/2020  Chief Complaint   Patient presents with     Follow-up     Home care will re-open case and start PT      Assessment/Plan  1. Atrophic vaginitis  Deferred exam.  Will continue estradiol cream in the interim until follows up with gynecology.  Will continue to use cranberry 400-600 mg twice daily  - Ambulatory referral to Gynecology    2. Major neurocognitive disorder (H)  3. Visual hallucinations  Majority of my time was spent performing this battery of test and explaining the results.  Counseled patient on definition of major neurocognitive impairment and how it relates to more than just memory but also executive function.  Informed her of difficulty with memory and performing tasks in the MOCA, and likely need for her to receive assistance from family to meet her instrumental activities daily living.  Will repeat MOCA in 6-12 months, and she remains independent on basic ADLs but was able to acknowledge that in the future she may need more assistance. No need for ID bracelet at this time in the setting of no concern about wandering behavior.  TSH and B12 levels within normal limits and PHQ 9 unremarkable makes pseudodementia less likely.  High threshold for checking RPR/VDRL levels.  CT head from 1/27/2020.  Did show chronic small vessel ischemic changes.  There is a question of TIA in the distant past and I will inquire with the patient and the daughter whether or not they would be interested in trial of donepezil, but unsure if this is secondary to vascular dementia. I think more likely disc suggestive of Lewy body dementia, for which donepezil may also still be beneficial.  Counseled daughter and family unlikely secondary to migraines, which decrease in frequency with age     Much or all of the text in this note was generated through the use of Dragon Dictate voice-to-text software. Errors in spelling or words  which seem out of context are unintentional. Sound alike errors, in particular, may have escaped editing  Enrique Lay MD    Return in about 6 months (around 9/13/2020), or if symptoms worsen or fail to improve.    Subjective  This 89 y.o. old female. Endorses having headaches - achy.  Reportedly has a history of migraine headaches.  No tearing, no n/v.  Headaches are frontal in location. No falls. No recent head trauma.  No photosensitivity to sound or light.  Continues to have visual hallucinations, noting that once in a while she will see something at the corner of her eye or lose vision of something at the corner of the eye.  Does see an ophthalmologist regularly for her bifocals.  Sees people as well as animals including a dog.  Most often the people in the form of young children.    ROS A comprehensive review of systems was performed and was otherwise negative    Medications, allergies, and problem list were reviewed and updated    Exam  General appearance: Pleasant, nontoxic-appearing, no acute distress, alert and oriented x4  Vitals:    03/13/20 1109   BP: 118/60   Pulse: 85   SpO2: 96%   EYES: Eyelids, conjunctiva, and sclera were normal.   RESPIRATORY: Bilaterally with no crackles, wheezing or rhonchi  CARDIOVASCULAR: Regular S1 and S2.  Radial pulses intact.  No edema.  SKIN/HAIR/NAILS: Skin color was normal.   NEUROLOGIC: Alert and oriented to person, place, time, and circumstance, but did think today was 5th of March. Speech was normal. MOCA 18/30, 0 out of 5 and delayed recall, proceeded with some difficulty with remembering the 5 words, improper cube, unable to do serial sevens with the 3 areas of significant deficit.  No difficulty with naming animals, Trail mapping/digit span  PSYCHIATRIC:  Mood and affect were normal and the patient had normal recent and remote memory. The patient's judgment and insight were normal.  PHQ9 0  Additional Information   Current Outpatient Medications    Medication Sig Dispense Refill     aspirin 81 MG EC tablet Take 81 mg by mouth 2 (two) times a day.       BIOTIN, BULK, MISC Take 1 tablet by mouth 2 (two) times a day.       CALCIUM ACETATE ORAL Take 1 tablet by mouth 2 (two) times a day. Nature Bounty Calcium + Vit D3 (500 mg - 25 mcg)       cholecalciferol, vitamin D3, (VITAMIN D3) 5,000 unit Tab Take 1 tablet by mouth 2 (two) times a day.       dicyclomine (BENTYL) 20 mg tablet Take 1 tablet (20 mg total) by mouth every 8 (eight) hours as needed (abdominal cramping). 20 tablet 0     estradiol (ESTRACE) 0.01 % (0.1 mg/gram) vaginal cream Daily for 2 weeks and then every other day afterwards 42.5 g 1     fluticasone propionate (FLONASE) 50 mcg/actuation nasal spray Apply 1 spray into each nostril daily.       L.acid/B.bifidum/B.animal/FOS (PROBIOTIC COMPLEX ORAL) Take 1 tablet by mouth 2 (two) times a day.       medium chain triglycerides (MCT OIL ORAL) Take 15 mL by mouth at bedtime.       NON FORMULARY Take 2 tablespoons daily for UTI prevention       polyvinyl alcohol (LIQUIFILM TEARS) 1.4 % ophthalmic solution Apply 1 drop to eye as needed for dry eyes.       No current facility-administered medications for this visit.      Allergies   Allergen Reactions     Sulfa (Sulfonamide Antibiotics) Nausea Only     Adhesive Rash     Social History     Social History Narrative     Not on file     Social History     Tobacco Use     Smoking status: Never Smoker     Smokeless tobacco: Never Used   Substance Use Topics     Alcohol use: Yes     Comment: rare     Drug use: No     Family History   Problem Relation Age of Onset     Heart disease Mother      Colon cancer Father      Breast cancer Sister      Stomach cancer Sister      Past Surgical History:   Procedure Laterality Date     APPENDECTOMY       HYSTERECTOMY       IN APPENDECTOMY      Description: Appendectomy;  Recorded: 07/18/2008;     IN TOTAL ABDOM HYSTERECTOMY      Description: Hysterectomy;  Recorded:  07/18/2008;     ID TOTAL ABDOM HYSTERECTOMY      Description: Hysterectomy;  Recorded: 07/18/2008;   Time: total time spent with the patient was 40 minutes of which >50% was spent in counseling and coordination of care

## 2021-07-23 NOTE — TELEPHONE ENCOUNTER
Please place an denver for PT, and I can sign it. Also, why did it close? I thought I placed it recently

## 2021-07-23 NOTE — TELEPHONE ENCOUNTER
Reason for Call:  Home Health Care    Torrie with [facility] called regarding (reason for call): verbal    Orders are needed for this patient. OT    PT: na    OT: 1 x week for 1 week  And 2 x week for 1 week  Laundry shower and cognitive testing and establish home exercise program    Skilled Nursing: na    Pt Provider:  Dr. Argueta     Phone Number Homecare Nurse can be reached at:   [phone]    Can we leave a detailed message on this number? Yes       Call taken on 6/11/2021 at 11:24 AM by Laura L Goldberg

## 2021-07-23 NOTE — TELEPHONE ENCOUNTER
Per Dr. Lay patient will need to come in for an appointment to fill out the POLST.     This was not discussed at the last OV and patient needs to sign this document as well.    Please schedule patient a 40 minute visit to discuss.    Thank you.    Whitley MOTT LPN .......... 8:44 AM  02/07/20

## 2021-07-23 NOTE — ADDENDUM NOTE
Addendum Note by Noam Larry CMA at 12/20/2019  5:09 PM     Author: Noam Larry CMA Service: -- Author Type: Certified Medical Assistant    Filed: 12/20/2019  5:09 PM Encounter Date: 12/20/2019 Status: Signed    : Noam Larry CMA (Certified Medical Assistant)    Addended by: NOAM LARRY on: 12/20/2019 05:09 PM        Modules accepted: Orders

## 2021-07-23 NOTE — TELEPHONE ENCOUNTER
Notified patient of below recommendations and advised to drop sample off at the hospital lab due to it being Friday afternoon.  Yessenia Larry CSS

## 2021-07-23 NOTE — ANESTHESIA CARE TRANSFER NOTE
Last vitals:   Vitals:    05/21/21 1537   BP: 144/65   Pulse: 76   Resp: 16   Temp: 37.3  C (99.1  F)   SpO2: 100%     Patient's level of consciousness is drowsy  Spontaneous respirations: yes  Maintains airway independently: yes  Dentition unchanged: yes  Oropharynx: oropharynx clear of all foreign objects    QCDR Measures:  ASA# 20 - Surgical Safety Checklist: WHO surgical safety checklist completed prior to induction    PQRS# 430 - Adult PONV Prevention: 4558F - Pt received => 2 anti-emetic agents (different classes) preop & intraop  ASA# 8 - Peds PONV Prevention: NA - Not pediatric patient, not GA or 2 or more risk factors NOT present  PQRS# 424 - Anastasia-op Temp Management: 4559F - At least one body temp DOCUMENTED => 35.5C or 95.9F within required timeframe  PQRS# 426 - PACU Transfer Protocol: - Transfer of care checklist used  ASA# 14 - Acute Post-op Pain: ASA14B - Patient did NOT experience pain >= 7 out of 10

## 2021-07-23 NOTE — PROGRESS NOTES
Assessment & Plan     Hansa was seen today for establish care.    Diagnoses and all orders for this visit:    Primary osteoarthritis of right ankle, this is the patient's main problem.  This is what she is concerned about the most.  Discussed avoiding nonsteroidal anti-inflammatories, because of her history of GI ulcer, and gastroesophageal reflux disease, and is still needing this, he is currently on omeprazole 20 mg twice a day.  Discussed with the patient, using Tylenol, extra strength, 1000 mg twice a day as needed for pain.  I have recommended that she take 1000 mg every day in the morning.    Chronic pain of right ankle, as above.  -     diclofenac sodium (VOLTAREN) 1 % Gel; Apply two times a day to three times a day to the right ankle  -     acetaminophen (TYLENOL EXTRA STRENGTH) 500 MG tablet; Take 2 tablets (1000 mg) every morning, and may take another two tablets later on (at least 4-6 hours later) for pain.    Other fatigue, patient has fatigue, will check CBC today.  Patient appears pale.  Globin is come back low at 10.9.  I added in labs, including iron studies, vitamin B12 and folate, hoping that these were added.  -     Basic Metabolic Panel  -     HM1(CBC and Differential)    Gastroesophageal reflux disease, unspecified whether esophagitis present, continue with omeprazole 20 mg twice a day.  -     omeprazole (PRILOSEC) 20 MG capsule; Take 1 capsule (20 mg total) by mouth 2 (two) times a day before meals.    History of gastric ulcer, patient also states she had an endoscopy in 2016.  We will review the records.  -     omeprazole (PRILOSEC) 20 MG capsule; Take 1 capsule (20 mg total) by mouth 2 (two) times a day before meals.    Abdominal pain, generalized, continues to have abdominal pain.  Has a history of irritable bowel syndrome, and was on dicyclomine, but is no longer on this.  -     Hepatic Profile    Lipid screening  -     LDL Cholesterol, Direct    Anemia due to other cause, not  classified, hemoglobin came back at 10.9.  -     Ferritin; Future  -     Iron and Transferrin Iron Binding Capacity; Future  -     Transferrin; Future  -     Vitamin B12; Future  -     Folate, Serum; Future    Abnormal finding of blood chemistry, unspecified   -     Ferritin; Future  -     Iron and Transferrin Iron Binding Capacity; Future  -     Transferrin; Future      Anemia: Hb has come back decreased at 10.9.  Patient had been using nonsteroidals at some point for her pain.  Previous hemoglobin 12.4 May 13, 2020.  She has also been on aspirin 81 mg twice a day.  I asked the patient to cut this back to aspirin 81 mg daily.  I am not sure why she needs the aspirin, except for possibly a history of stroke in the past, will have to review her echo ordered.  Patient has a history of previous GI ulcer, per her daughter.  Patient is currently on omeprazole 20 mg twice a day. Have reiterated that she shouldn't use any OTC Advil or Aleve or other NSAID's. Only use Tylenol for pain.       70 minutes spent on the date of the encounter doing chart review, review of outside records, review of test results, patient visit, documentation and discussion with family     Return in about 4 months (around 9/5/2021), or Follow with Dr. Argueta. I will ask for an earlier return as she has this anemia.    Giselle Argueta MD  [facility]   Hansa Ly is 89 y.o. and presents today for the following health issues     HPI   Patient is here to establish primary care.    She was seen March 22, 2021 complaining of heart palpitations.  Hypertension.  EKG was done that visit, and showed normal sinus rhythm.  Chronic pain right ankle. History of stroke.     MOCA 18/30 in March 2020. Lives in an assisted living. Has just moved into an assisted living 8 weeks ago.  Patient also says that they make a diagnosis of Lewy body dementia January or February 2020.  Since that time this  diagnosis has been refuted.  Patient does have trouble with her memory.    Patient is talking about when she has moved back from [place].  Patient and her daughter started crying because the patient had become very sick while she was in [place] and was no longer able to live by herself.  Her daughter felt guilty about this, however this was the only option she had.  Patient has significant loss of weight, she had gone down to 88 pounds.  She had hallucinations, of little children.  There was some concern at that time that she had Lewy body dementia.  This was a very difficult time for the patient and her daughter.  She has improved since then.  Her current weight is 102 pounds.  She no longer has any of those hallucinations.  I had some discussion regarding this.    She has history of a stomach ulcer, per her daughter.  She is also being seen by gastroenterology, and has had an endoscopy in the past.  She was on ranitidine, however this was recalled.  She then went on to omeprazole, which has made a difference to her abdominal pain.  She is currently on omeprazole 20 mg twice a day.  Patient had an endoscopy in 2016.    For her right-sided ankle pain.  Patient is not taking anti-inflammatories anymore, such as Advil or Aleve.  This is good, particularly with her history of GI ulcer, and gastroesophageal reflux.  Discussed with her, trying extra strength Tylenol, 1000 mg every morning.  She may take another dose of 1000 mg, later on in the day, if this is helpful for pain.  She has had injections in her ankle before, but these stopped helping.  Her daughter has got her meghan Curcumin and has her take one twice a day for pain.     Patient discussed issues she has with her feet. She has seen podiatry with little help. She is putting cotton swabs between her toes. I think she is doing a good job.      She has a history of migraines.    Allergies   Allergen Reactions     Sulfa (Sulfonamide Antibiotics) Nausea Only      Adhesive Rash       Results for NARAYAN HINTON (MRN [id number]) as of 5/5/2021 13:23   Ref. Range 5/13/2020 08:10   Sodium Latest Ref Range: 136 - 145 mmol/L 140   Potassium Latest Ref Range: 3.5 - 5.0 mmol/L 4.2   Chloride Latest Ref Range: 98 - 107 mmol/L 102   CO2 Latest Ref Range: 22 - 31 mmol/L 31   Anion Gap, Calculation Latest Ref Range: 5 - 18 mmol/L 7   BUN Latest Ref Range: 8 - 28 mg/dL 15   Creatinine Latest Ref Range: 0.60 - 1.10 mg/dL 0.84   GFR MDRD Af Amer Latest Ref Range: >60 mL/min/1.73m2 >60   GFR MDRD Non Af Amer Latest Ref Range: >60 mL/min/1.73m2 >60   Calcium Latest Ref Range: 8.5 - 10.5 mg/dL 9.3   Glucose Latest Ref Range: 70 - 125 mg/dL 78     Results for NARAYAN HINTON (MRN [id number]) as of 5/5/2021 13:23   Ref. Range 5/13/2020 08:10   Vitamin D, Total (25-Hydroxy) Latest Ref Range: 30.0 - 80.0 ng/mL 46.7   Vitamin B-12 Latest Ref Range: 213 - 816 pg/mL 420   TSH Latest Ref Range: 0.30 - 5.00 uIU/mL 3.20     Results for NARAYAN HINTON (MRN [id number]) as of 5/5/2021 13:23   Ref. Range 5/13/2020 08:10   WBC Latest Ref Range: 4.0 - 11.0 thou/uL 8.0   RBC Latest Ref Range: 3.80 - 5.40 mill/uL 4.23   Hemoglobin Latest Ref Range: 12.0 - 16.0 g/dL 12.4   Hematocrit Latest Ref Range: 35.0 - 47.0 % 39.8   MCV Latest Ref Range: 80 - 100 fL 94   MCH Latest Ref Range: 27.0 - 34.0 pg 29.3   MCHC Latest Ref Range: 32.0 - 36.0 g/dL 31.2 (L)   RDW Latest Ref Range: 11.0 - 14.5 % 13.0   Platelets Latest Ref Range: 140 - 440 thou/uL 248       Past Medical History:   Diagnosis Date     Cervical pain (neck)      Chronic interstitial cystitis      Dysuria     stress incontinence     Hyperlipemia      IBS (irritable bowel syndrome)      TIA (transient ischemic attack)      Past Surgical History:   Procedure Laterality Date     APPENDECTOMY       HYSTERECTOMY       DE APPENDECTOMY      Description: Appendectomy;  Recorded: 07/18/2008;     DE TOTAL ABDOM HYSTERECTOMY      Description: Hysterectomy;  Recorded:  07/18/2008;     W[place] TOTAL ABDOM HYSTERECTOMY      Description: Hysterectomy;  Recorded: 07/18/2008;     Allergies   Allergen Reactions     Sulfa (Sulfonamide Antibiotics) Nausea Only     Adhesive Rash     Social History     Tobacco Use     Smoking status: Never Smoker     Smokeless tobacco: Never Used   Substance Use Topics     Alcohol use: Yes     Comment: rare     Drug use: No     Social History     Social History Narrative    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  She lives in [facility], [facility]  She moved in 8 weeks ago.  She used to live in [place], and her daughter moved to [place], due to failing health.  Daughter puts her medications into the pillbox.  She has 7 children.  They live close by, except for one in [place], and [address] [place].  The daughter Joan lives really close by . Patient is essentially independence, but does live in an assisted living.  Patient wants to know why she is living in assisted living, as she feels that she does all of her own things.  Discussed with her that it is good that she is there, because she can get more care if she needs it.        Giselle Argueta MD 5/5/2021     Family History   Problem Relation Age of Onset     Heart disease Mother      Colon cancer Father      Breast cancer Sister      Stomach cancer Sister        Review of Systems   Rest of the review of systems is negative.      Objective    /58 (Patient Site: Right Arm, Patient Position: Sitting, Cuff Size: Adult Small)   Pulse 80   Temp 98  F (36.7  C) (Oral)   Resp 16   Ht 5' (1.524 m)   Wt 101 lb (45.8 kg)   BMI 19.73 kg/m    Body mass index is 19.73 kg/m .  Physical Exam   Constitutional: She is oriented to person, place, and time. No distress.   Thin lady, walks without an assistive device. She looks pale.   Cardiovascular: Normal rate and regular rhythm.   No murmur heard.  Possible  short AESM.    Pulmonary/Chest: No respiratory distress. She has no wheezes.   Abdominal: She exhibits no distension. There is no abdominal tenderness.   Abdomen soft and nontender, no organomegaly, and no masses.   Musculoskeletal:         General: No edema.      Comments: Her feet are in very good condition.  She does not have any dryness of her skin.  She has no calluses.  She has a small, possible callus between her right index toe and her right big toe.  No infection.  Good dorsalis pedis pulses.   Neurological: She is alert and oriented to person, place, and time.   Able to tell me things. Has some poor memory of events in the past.    Psychiatric: She has a normal mood and affect. Her behavior is normal.       Current Outpatient Medications   Medication Sig Dispense Refill     aspirin 81 MG EC tablet Take 81 mg by mouth daily.       cholecalciferol, vitamin D3, (VITAMIN D3) 5,000 unit Tab Take 1 tablet by mouth 2 (two) times a day.       diclofenac sodium (VOLTAREN) 1 % Gel Apply two times a day to three times a day to the right ankle 100 g 2     estradiol (ESTRACE) 0.01 % (0.1 mg/gram) vaginal cream Daily for 2 weeks and then every other day afterwards 42.5 g 1     L.acid/B.bifidum/B.animal/FOS (PROBIOTIC COMPLEX ORAL) Take 1 tablet by mouth 2 (two) times a day.       polyvinyl alcohol (LIQUIFILM TEARS) 1.4 % ophthalmic solution Apply 1 drop to eye as needed for dry eyes.       acetaminophen (TYLENOL EXTRA STRENGTH) 500 MG tablet Take 2 tablets (1000 mg) every morning, and may take another two tablets later on (at least 4-6 hours later) for pain. 100 tablet 2     medium chain triglycerides (MCT OIL ORAL) Take 15 mL by mouth at bedtime.       omeprazole (PRILOSEC) 20 MG capsule Take 1 capsule (20 mg total) by mouth 2 (two) times a day before meals. 180 capsule 3     No current facility-administered medications for this visit.      Also states that she is not currently taking MCT, it was recommended by her  ambrosio.

## 2021-07-23 NOTE — TELEPHONE ENCOUNTER
Can you please place a referral for OB, not sure if it needs to be a document as she is in an [facility]

## 2021-07-23 NOTE — PROGRESS NOTES
Assessment & Plan     Heart palpitations  Unlikely cardiac in nature.  Patient has well-controlled blood pressure no known documented history of heart disease.  EKG consistent with normal sinus rhythm.  Counseled patient to please set abstain from caffeine beverages at night, and to increase her omeprazole use to twice daily dosing for 2-4 weeks.  Gastritis/esophagitis in the setting of NSAID use is also a possibility and should be improved with abstaining from Aleve and temporal increase use of NSAIDs.  - Electrocardiogram Perform and Read    Chronic pain of right ankle  I daily will try to limit use of NSAIDs as this may also be irritating her gut.  Will trial course of Voltaren gel.  Low threshold to prescribe tramadol.  Recommended if she wants to pursue further treatment for her pain outside of the orthotics, to consider reconnecting with the podiatrist whom she recently saw a few weeks ago.  - diclofenac sodium (VOLTAREN) 1 % Gel  Dispense: 100 g; Refill: 2    Osteopenia, unspecified location  Patient reluctant to undergo bone density scan.  Vitamin D level when checked in May 2020 normal at 46.7.  Counseled to take 2000 units of vitamin D3 daily and ensure she is receiving an adequate amount of supplemental calcium    4. Abnormal peripheral vision of left eye  Unable to identify any obvious symptoms on initial exam.  Counseled patient and daughter to follow through with optometry/ophthalmology if they would like a more specific pain quantitative evaluation.    5 Impairment of cognitive function  MOCA of 18/30 when checked in March 2020.  Actually appears more intact on presentation today.  Seems to be enjoying her time at the assisted living facility.  Her daughter is very involved in her care which is reassuring.  No concerns at this moment regarding safety.  Continue to monitor closely.  Likely would benefit from repeat cognitive assessment in the summer of fall with new PCP/Dr Argueta    Review of  external notes as documented in note  40 minutes spent on the date of the encounter doing chart review, history and exam, documentation and further activities as noted above     Return in about 3 months (around 6/22/2021), or if symptoms worsen or fail to improve, for Follow up with Ellie.    Enrique Lay MD  [facility]    Subjective   Hansa Ly is 89 y.o. and presents today for the following health issues   HPI Ms Aliyah Argueta with her daughter Amie.  Appreciating her time at [facility].  Somewhat indifferent about the food on most days and is somewhat disappointed as she has gained roughly 10 pounds in weight.  Endorses having an episode of palpitations on a specific evening in the setting of all of a sudden starting to drink 1 cup of coffee during the social hour on a daily basis.  Denies pain per se location is more midline in nature.  Patient does have a history of peptic ulcer not necessarily secondary to H. pylori.  Denies angina, fever, sweats or chills.  This did not happen prior to moving to the [facility].  Was on ranitidine but this was discontinued after the recall.  Her daughter started her on omeprazole once daily.  Endorses some stress with her move.  Has no known history of heart disease.  She notes this episode of palpitations/chest discomfort when laying supine in bed.  Does not occur during the day.  Wanted to talk a little bit about bone health in the setting of no history of recent falls.  She does intend to get a pair walking sticks.  Imaging has noted osteopenia but she is reluctant to undergo bone mineral density.  Does take 2644-2064 units of vitamin D daily and a calcium supplement.  Complains of consistent right ankle pain which she manages with restorative stretches and massaging on a daily basis.  Is taking Aleve once daily.  Daughter is concerned about some decline in her peripheral vision as the patient  "tends to reach out for objects on her left side.  She denies any other neurologic symptoms whatsoever.  She would like to have her feet toes on the right evaluated in the setting of recently seen a podiatrist who recommended orthotics and some other interventions to help with outgrowths in her toe joints.    Review of Systems  ROS A comprehensive review of systems was performed and was otherwise negative      Objective    /52 (Patient Site: Right Arm, Patient Position: Sitting, Cuff Size: Adult Regular)   Pulse 88   Ht 5' 1\" (1.549 m)   Wt 102 lb (46.3 kg)   SpO2 96%   BMI 19.27 kg/m    Body mass index is 19.27 kg/m .  Physical Exam  GENERAL APPEARANCE: Pleasant, nontoxic-appearing, NAD  EYES: Eyelids, conjunctiva, and sclera were normal.  Exam for peripheral vision intact/normal.  HEENT: Head normal. Hearing was normal to voice.    RESPIRATORY: Bilaterally with no c/w/r  CARDIOVASCULAR: Regular S1/S2. Radial pulses intact.  No LE edema.  MUSCULOSKELETAL: Skeletal configuration was normal and muscle mass was normal for age.  chronic OA changes in hands.  No hammertoes in the right foot.  Does have no nerythematous or tender nodules in the distal toe joints (?  Versus) otherwise right foot is unremarkable for edema/swelling.  EKG: Normal sinus rhythm.   ms  SKIN/HAIR/NAILS: Skin color was normal. Hair normal.  NEUROLOGIC: AOX4. Speech was normal.   PSYCHIATRIC:  Mood and affect were normal and the patient had normal recent and remote memory. The patient's judgment and insight were normal.        "

## 2021-07-23 NOTE — PATIENT INSTRUCTIONS - HE
Please stop taking the Aleve  - I will write a Rx for voltaren gel to apply two times a day to three times a day  Your EKG looks good, but it would be review by cardiologist, and I will give you an update  Consider increasing the omeprazole/pink pill from 20 mg daily to 20 mg two times a day for 2 - 4 weeks to see if your symptoms improve  Please consider stopping the caffeine intake at night  Remember to pick a color for your walking stick  You can take 2000 international unit(s) of vitamin D daily and make sure you take at least 1200 mg of dietary calcium

## 2021-07-23 NOTE — PROGRESS NOTES
Office Visit - Follow up    Hansa Ly   89 y.o. female    Date of Visit: 12/6/2019    Chief Complaint   Patient presents with     Hospital Visit Follow Up     diarrhea and nausea-  C-Diff     Medication Questions     Probiotic       Subjective: Colitis secondary to pseudomembranous colitis and C. difficile toxin.    Previously on antibiotics then hospitalized for abdominal pain with diarrhea.  C. difficile toxin isolated and treated with vancomycin.  Questions regarding probiotics and I referred her to her pharmacist for best probiotic supplement.  I did recommend completing vancomycin course of 125 mg 4 times a day plus I did suggest Metamucil heaping tablespoon once daily.  She was hospitalized for abdominal pain diarrhea and colitis secondary to C. difficile toxin from November 29, 2019 through December 3, 2019 feels better now less diarrhea.  No blood in stool or urine no hemoptysis.    Medication list reconciled she is allergic to sulfa and adhesive tape with a rash.  The patient does not smoke and she does not.  The patient did have a cortisone injection in the right ankle now foot bothering her.  The patient spends most of her time in [place] she recently returned home from [place] when her grandson  and English woman from [place].    ROS: A comprehensive review of systems was performed and was otherwise negative    Medications:  Prior to Admission medications    Medication Sig Start Date End Date Taking? Authorizing Provider   fluticasone propionate (FLONASE) 50 mcg/actuation nasal spray Apply 1 spray into each nostril daily. Shake.   Yes PROVIDER, HISTORICAL   levocetirizine (XYZAL) 5 MG tablet Take 5 mg by mouth at bedtime.   Yes PROVIDER, HISTORICAL   medium chain triglycerides (MCT OIL ORAL) Take 15 mL by mouth at bedtime.   Yes PROVIDER, HISTORICAL   ranitidine (ZANTAC) 150 MG tablet Take 150 mg by mouth 2 (two) times a day.    Yes PROVIDER, HISTORICAL   vancomycin (VANCOCIN) 125 MG  capsule Take 1 capsule (125 mg total) by mouth 4 (four) times a day for 7 days. 12/3/19 12/10/19 Yes Andrae Dumas MD   polyvinyl alcohol (LIQUIFILM TEARS) 1.4 % ophthalmic solution Apply 1 drop to eye as needed for dry eyes.    PROVIDER, HISTORICAL   SURGICAL LUBRICANT JELLY TOP Apply topically as needed.    PROVIDER, HISTORICAL       Allergies:   Allergies   Allergen Reactions     Sulfa (Sulfonamide Antibiotics) Nausea Only     Adhesive Rash       Immunizations:   Immunization History   Administered Date(s) Administered     DT (pediatric) 01/01/2001     Influenza, inj, historic,unspecified 10/11/2013     Influenza, seasonal,quad inj 6-35 mos 09/14/2010     Pneumo Conj 13-V (2010&after) 12/20/2016, 10/14/2019     Pneumo Polysac 23-V 08/04/1999       Exam Chest clear to auscultation and percussion.  Heart tones regular rhythm without murmur rub or gallop.  Abdomen soft nontender no organomegaly.  No peritoneal signs.  Extremities free of edema cyanosis or clubbing.  Neck veins nondistended no thyromegaly or scleral icterus noted, carotids full.  Skin warm and dry easily conversant good spirited.  Normal intelligence.  Neurologically intact no gross localizing findings.    Weight 94 pounds stable previous weight 94 pounds BMI 18 blood pressure recheck 134/62 pulse 75 regular respirations 18 unlabored O2 sats 97% on room air.    Assessment and Plan  Colitis secondary to C. difficile toxin.  Advised patient to complete course of vancomycin symptoms have improved add probiotic and Metamucil.  Advised patient regarding potential for recurrence and difficulty to read the patient of this organism causing C. difficile toxin colitis.  Patient understands.  Anticipate follow-up with her [place] physician upon her return there after the holidays [place].    Sulfa and adhesive rash stable.    History of irritable bowel syndrome with mucus colitis and/or spastic bowels Metamucil may help.    Ankle and foot pain may need  further evaluation with orthopedic foot and ankle specialist.  Suggest [facility].    Low body weight needs weight to be gained on with protein caloric supplements today accompanied by her daughter Joan.    Recent foreign travel for her grandsons wedding in [facility].    Time: total time spent with the patient was 40 minutes of which >50% was spent in counseling and coordination of care    The following low BMI interventions were performed this visit: weight gain advised    Calin Spangler MD    Patient Active Problem List   Diagnosis     Abdominal Pain     Hyperlipidemia     Constipation     Irritable Bowel Syndrome     Female Stress Incontinence     Limb Pain     Sore Throat     Neck Pain     C. difficile colitis

## 2021-07-23 NOTE — TELEPHONE ENCOUNTER
Great thank you.    Patient does need the labs to be done though, can these be done when she is seen at her other appointments?    Giselle Argueta MD

## 2021-07-23 NOTE — PROGRESS NOTES
"  Assessment/Plan:       1. Right ankle pain  Likely related to old sprain vs. Age related changes. I discussed continueing with symptomatic management and decreasing walking from daily for 30 min to 3-4 days a week. She could decrease walk time and work her way back up as well to avoid on-going pain. I encouraged her to continue with ice, rest, ankle brace, and soaking in epsom salt as long as it seems to make a difference.  I discussed ankle ROM and exercises to improve flexibility and strength. Plan on following up if symptoms are not improving or worsening.   - XR Ankle Right 3 or More VWS; Future    2. Globus sensation  Likely related to reflux however, she is taking zantac twice daily. Given history of stomach ulcer I discuss potentially returning to GI or giving omeprazole a 2 week trial to see if this will improve her symptoms. Patient would like to hold off on medication. Given her history of stroke further evaluation by ENT is valid. Perhaps a swallow study as well. Will start with ENT and follow up based on findings. She is in agreement with this plan.   - Ambulatory referral to ENT        Subjective:      Hansa Ly is a 89 y.o. female who presents for concerns of ankle pain on the right foot. She reports that she had sprained her ankle several years ago and it has \"come back to haunt me\". She recently increased her walking again and her ankle has been bothersome. The pain is on the outside of the ankle as well as the front of the ankle. No numbness or tingling. She did have some swelling but has been soaking with epsom salts as well as ice. This has been helping with the minimizing the swelling. Her ankle pain has improved over the last 2 days. She is wearing an ankle brace as well as elevating foot often. This seems to be helpful. She also has taken the last two days off of walking with her daughter. She does report that she has been walking about 30 min daily for and at times twice daily until " her ankle started to become more bothersome. No other medication has been used or taken to help with her symptoms.     She also mentions that over the last 6 months she has had a sensation of something stuck in her throat. She had a stroke 6 years ago. She has a gravely voice due to this. She does not believe that the stroke caused any difficulties with swallowing or choking sensations. She has also had a stomach ulcer in the past as well. She is not having any abdominal pain or sensation of reflux. She would like a referral for further evaluation for this if it is needed.  She denies any other concerns today and overall is feeling well.     The following portions of the patient's history were reviewed and updated as appropriate: allergies, current medications, past family history, past medical history, past social history, past surgical history and problem list.    Past Medical History:   Diagnosis Date     Cervical pain (neck)      Chronic interstitial cystitis      Dysuria     stress incontinence     Hyperlipemia      IBS (irritable bowel syndrome)      TIA (transient ischemic attack)      Past Surgical History:   Procedure Laterality Date     APPENDECTOMY       HYSTERECTOMY       OK APPENDECTOMY      Description: Appendectomy;  Recorded: 07/18/2008;     OK TOTAL ABDOM HYSTERECTOMY      Description: Hysterectomy;  Recorded: 07/18/2008;     OK TOTAL ABDOM HYSTERECTOMY      Description: Hysterectomy;  Recorded: 07/18/2008;     Social History     Social History     Marital status:      Spouse name: N/A     Number of children: N/A     Years of education: N/A     Occupational History     Not on file.     Social History Main Topics     Smoking status: Never Smoker     Smokeless tobacco: Never Used     Alcohol use Yes      Comment: rare     Drug use: No     Sexual activity: Not on file     Other Topics Concern     Not on file     Social History Narrative     History reviewed. No pertinent family history.  Current  Outpatient Prescriptions   Medication Sig Note     aspirin 81 MG EC tablet Take 81 mg by mouth daily. Two daily      CYANOCOBALAMIN, VITAMIN B-12, (VITAMIN B-12 ORAL) Take by mouth.      ranitidine (ZANTAC) 150 MG tablet Bid 8/22/2017: Received from: External Pharmacy     cholecalciferol, vitamin D3, (VITAMIN D3) 1,000 unit capsule Take 1,000 Units by mouth daily.        Review of Systems   A 12 point comprehensive review of systems was negative except as noted.      Objective:      /80 (Patient Site: Left Arm, Patient Position: Sitting, Cuff Size: Adult Regular)  Pulse 80  Temp 98.2  F (36.8  C) (Oral)   Resp 16    General appearance: alert, appears stated age and cooperative  Head: Normocephalic, without obvious abnormality, atraumatic  Throat: lips, mucosa, and tongue normal; teeth and gums normal  Neck: no adenopathy, no carotid bruit, no JVD, supple, symmetrical, trachea midline and thyroid not enlarged, symmetric, no tenderness/mass/nodules  Lungs: clear to auscultation bilaterally  Heart: regular rate and rhythm, S1, S2 normal, no murmur, click, rub or gallop  Extremities: overall normal. Right ankle evaluation shows minimal lateral malleous swelling. No pain to palpation. normal circulation. no pedal edema. ROM WNL. Ankle ROM normal. No increased pain with passive or active ROM when non-weight bearing. Pain is present with weight bearing.   Pulses: 2+ and symmetric  Skin: Skin color, texture, turgor normal. No rashes or lesions  Neurologic: Grossly normal     Xray: Right ankle evaluated in clinic by myself with the patient. Some soft tissue swelling present with some arthritic changes. Ankle is otherwise relatively normal appearing. NO fracture seen. Official radiology evaluation as below.     Xr Ankle Right 3 Or More Vws    Result Date: 6/7/2018  XR ANKLE RIGHT 3 OR MORE VWS 6/7/2018 4:05 PM INDICATION: Ankle pain front and lateral COMPARISON: None. FINDINGS: Mild soft tissue swelling along the  lateral malleolus with a small effusion. Ankle mortise is intact. No fracture or dislocation. Bones are demineralized. Paucity of degenerative changes given age.

## 2021-07-23 NOTE — PROGRESS NOTES
Preoperative Exam    Scheduled Procedure: Cyst Removal on Bladder  Surgery Date:  07/30/2020  Surgery Location: [facility]  - FAX: [phone]  Surgeon:  Dr. Wadsworth    Assessment/Plan:     1. Preop general physical exam  2. Other specified disorders of bladder  Low risk surgery with no known history of cardiac disease/CAD.  Vital signs unremarkable and appears nontoxic today.  Not on any anticoagulants/blood thinners currently as she is holding the aspirin.  Counseled to speak with urologist regarding taking once daily Azo tablets for symptomatic relief.  BMP shows normal renal function from May 2020.  - Electrocardiogram Perform and Read    3. Right ankle tendonitis  Counseled at length regarding potential risk for infection or complications with any surgery.  Seems to obtain relief from using acetaminophen and thus recommended trial of 1 g twice daily in addition to wearing brace when active.  If no significant relief then can probably benefit from a more surgical intervention.      Surgical Procedure Risk: Low (reported cardiac risk generally < 1%)  Have you had prior anesthesia?: Yes  Have you or any family members had a previous anesthesia reaction:  No  Do you or any family members have a history of a clotting or bleeding disorder?: No  Cardiac Risk Assessment: no increased risk for major cardiac complications    APPROVAL GIVEN to proceed with proposed procedure, without further diagnostic evaluation     Functional Status: Independent  Patient plans to recover at home with family.     Subjective:      Hansa Ly is a 89 y.o. female who presents for a preoperative consultation.  To remove a bladder cyst that appears to be a nidus for infection.  There is a history of ulcerative cystitis.  Stoped takin vitamins and aspirin last week. Taking 1 tab of pyridium 99.5 mg daily X 2 weeks.  Would like to know whether this can be continued up until the procedure in 2 days as it does provide some  symptomatic relief.  Endorses drinking lots of fluid and denies any renal dysfunction.  BMP from May 2020 unremarkable.  Regarding her ankle pain, she is doing ankle exercises and walking the stairs. Seen by a foot and ankle specialist, who noted that she could benefit from surgery for optimal long term pain relief. Was taking APAP daily, but stopped about a week ago because of the cyst cauterization. 8/10 right foot with going down he stairs, and did not complain of taking it with the 500 mg APAP. Does have history of gastric ulcer - takes pepcid PRN     All other systems reviewed and are negative, other than those listed in the HPI.    Pertinent History  Do you have difficulty breathing or chest pain after walking up a flight of stairs: No  History of obstructive sleep apnea: No  Steroid use in the last 6 months: No  Frequent Aspirin/NSAID use: No  Prior Blood Transfusion: No  Prior Blood Transfusion Reaction: No  If for some reason prior to, during or after the procedure, if it is medically indicated, would you be willing to have a blood transfusion?:  There is no transfusion refusal.    Current Outpatient Medications   Medication Sig Dispense Refill     dicyclomine (BENTYL) 20 mg tablet Take 1 tablet (20 mg total) by mouth every 8 (eight) hours as needed (abdominal cramping). 20 tablet 0     estradiol (ESTRACE) 0.01 % (0.1 mg/gram) vaginal cream Daily for 2 weeks and then every other day afterwards 42.5 g 1     fluticasone propionate (FLONASE) 50 mcg/actuation nasal spray Apply 1 spray into each nostril daily.       polyvinyl alcohol (LIQUIFILM TEARS) 1.4 % ophthalmic solution Apply 1 drop to eye as needed for dry eyes.       aspirin 81 MG EC tablet Take 81 mg by mouth 2 (two) times a day.       BIOTIN, BULK, MISC Take 1 tablet by mouth 2 (two) times a day.       CALCIUM ACETATE ORAL Take 1 tablet by mouth 2 (two) times a day. Nature Bounty Calcium + Vit D3 (500 mg - 25 mcg)       cholecalciferol, vitamin D3,  (VITAMIN D3) 5,000 unit Tab Take 1 tablet by mouth 2 (two) times a day.       L.acid/B.bifidum/B.animal/FOS (PROBIOTIC COMPLEX ORAL) Take 1 tablet by mouth 2 (two) times a day.       medium chain triglycerides (MCT OIL ORAL) Take 15 mL by mouth at bedtime.       NON FORMULARY Take 2 tablespoons daily for UTI prevention       No current facility-administered medications for this visit.       Allergies   Allergen Reactions     Sulfa (Sulfonamide Antibiotics) Nausea Only     Adhesive Rash     Patient Active Problem List   Diagnosis     Hx of hyperlipidemia     Past Medical History:   Diagnosis Date     Cervical pain (neck)      Chronic interstitial cystitis      Dysuria     stress incontinence     Hyperlipemia      IBS (irritable bowel syndrome)      TIA (transient ischemic attack)      Past Surgical History:   Procedure Laterality Date     APPENDECTOMY       HYSTERECTOMY       NV APPENDECTOMY      Description: Appendectomy;  Recorded: 07/18/2008;     NV TOTAL ABDOM HYSTERECTOMY      Description: Hysterectomy;  Recorded: 07/18/2008;     NV TOTAL ABDOM HYSTERECTOMY      Description: Hysterectomy;  Recorded: 07/18/2008;     Social History     Socioeconomic History     Marital status:      Smoking status: Never Smoker     Smokeless tobacco: Never Used   Substance and Sexual Activity     Alcohol use: Yes     Comment: rare     Drug use: No     Patient Care Team:  Enrique Lay MD as PCP - General (Internal Medicine)  Enrique Lay MD as Assigned PCP    Objective:     Vitals:    07/28/20 0832   BP: 120/68   Pulse: 85   SpO2: 96%   Weight: (!) 99 lb 1.3 oz (44.9 kg)     Physical Exam:  GENERAL APPEARANCE: Pleasant, nontoxic-appearing, no acute distress   EYES: Eyelids, conjunctiva, and sclera were normal.   RESPIRATORY: Bilaterally with no crackles, wheezing or rhonchi  CARDIOVASCULAR: Regular S1 and S2.  Radial pulses intact. Trace pitting edema around the ankles  bilaterally  GASTROINTESTINAL: NABS. Soft. No organ enlargement or tenderness.  No tenderness, mass, or enlarged organs.   MUSCULOSKELETAL: Skeletal configuration was normal and muscle mass was normal for age. Joint appearance was overall normal.  SKIN/HAIR/NAILS: Skin color was normal.    NEUROLOGIC: Alert and oriented x4. Speech was normal. Cranial nerves were normal. No focal deficits  PSYCHIATRIC:  Mood and affect were normal and the patient had normal recent and remote memory. The patient's judgment and insight were normal.    Patient Instructions   Take 1000 mg (500 mg X 2) two times a day of acetaminophen daily for your right ankle/foot        EKG:  NSR, HR 81, no ST/T wave abnormalities    Labs:  No labs were ordered during this visit    Immunization History   Administered Date(s) Administered     DT (pediatric) 01/01/2001     Influenza high dose,seasonal,PF, 65+ yrs 09/26/2019     Influenza, inj, historic,unspecified 10/11/2013     Influenza, seasonal,quad inj 6-35 mos 09/14/2010     PPD Test 02/07/2020     Pneumo Conj 13-V (2010&after) 12/20/2016, 10/14/2019     Pneumo Polysac 23-V 08/04/1999     Electronically signed by Enrique Lay MD 07/28/20 8:34 AM

## 2021-07-23 NOTE — TELEPHONE ENCOUNTER
Orders being requested:  Physicians plan of care orders for new admit to nursing home.  Reason service is needed/diagnosis: New admit to nursing home .  When are orders needed by:  As soon as possible in order to provide patient with move in date.  Where to send Orders: Fax: [phone]   Okay to leave detailed message?  Yes    Please call RN if additional information is needed.

## 2021-07-23 NOTE — TELEPHONE ENCOUNTER
Whitley if possible can we please reach out to this clinic next week to try to get access to her neurology notes in order to better understand their work-up for the patient's visual hallucinations.  Thank you

## 2021-07-23 NOTE — TELEPHONE ENCOUNTER
Dr. Lay,    Normal referral placement/process.    What is the DX you want used for me to place the order?    Whitley MOTT LPN .......... 8:49 AM  04/29/20

## 2021-07-23 NOTE — TELEPHONE ENCOUNTER
Left message to call back for: Tari  Information to relay to patient:  Ok to schedule with PCP this Friday 12/6 @ 11:20am- no other avail appts with PCP.

## 2021-07-23 NOTE — TELEPHONE ENCOUNTER
"In the hospital for C-Diff.in November for 4 days.  She reports she finished all her c-diff medication.    She reports today , that she was   Up at 4AM, with Diarrhea,   Again this AM twice.    No abdominal pain.No fever.  Not a big eater.  No blood in stool.  She reports stools have been a little loose,for awhile  but now her stools have been one after the other, and \"just runs\" out.   What to do for patient.  Could this be C-Diff returning she asks.?  Can someone call patient regarding need for more medication. Can she, should she use Imodium/kaopectate ?  Patient was instructed regarding clear liquids and no dairy.  Included electolyte drinks.  She is waiting for callback regarding medication for this.    Please advise.  Cyndy Huston RN  [facility] Triage/refill nurse        "

## 2021-07-23 NOTE — TELEPHONE ENCOUNTER
Who is calling:  Patient's daughter   Reason for Call:  Please schedule the appointment that was listed below for an F2F appt. It was placed on hold so I was not able to schedule this appointment. They would prefer not to do virtual.    Okay to leave a detailed message: No

## 2021-07-23 NOTE — TELEPHONE ENCOUNTER
New Appointment Needed  What is the reason for the visit:    Pre-Op Appt Request  When is the surgery? :  7/30/20  Where is the surgery?:   [place]  Who is the surgeon? :  Dr. Wadsworth  What type of surgery is being done?: Remove cyst from bladder  Provider Preference: PCP only  How soon do you need to be seen?: tomorrow  Waitlist offered?: No  Okay to leave a detailed message:  Yes

## 2021-07-23 NOTE — TELEPHONE ENCOUNTER
Spoke with Francesca and relayed message below from Dr. Lay.  She verbalized understanding and had no further questions at this time.  Ivonne BROOKS, CHACORTA/NATHAN....................5:00 PM

## 2021-07-23 NOTE — TELEPHONE ENCOUNTER
LMTCB 5/6. Please relay message from Dr Argueta below and help pt or her daughter schedule an earlier appointment.

## 2021-07-23 NOTE — LETTER
Letter by Enrique Lay MD at      Author: Enrique Lay MD Service: -- Author Type: --    Filed:  Encounter Date: 3/13/2020 Status: (Other)         March 13, 2020     Patient: Hansa Ly   YOB: 1928   Date of Visit: 3/13/2020     To Whom It May Concern at at [employer]:    It is my medical recommendation that Ms Hansa Ly avoid any airports or air travel for the unforseen future until resolution and.or clarification from the CDC regarding prevalence, incidence and transmission of the coronavirus, especially for frail and/or older adults.    If you have any questions or concerns, please don't hesitate to call.    Sincerely,    Electronically signed by Enrique Lay MD

## 2021-07-23 NOTE — TELEPHONE ENCOUNTER
Called pt's daughter to schedule her for a future lab appointment and she would not like to do that at this time. Has too many appointments right now.    Tried to schedule pt for Friday, July 2nd but the time they wanted was not available so we made an appointment for Monday, July 5th at 1040am.

## 2021-07-23 NOTE — TELEPHONE ENCOUNTER
Could you please inquire what other symptoms is she having?    How long this is been going on relative to completing the nitrofurantoin prescribed on February 18, 2020?    If she still using the twice weekly estradiol cream?    And lastly if she is met with gynecology?  Thank you

## 2021-07-23 NOTE — TELEPHONE ENCOUNTER
Someone please reach out to the daughter for clarification as to what exactly she needs from me/us to help with the patient's transition to the nursing home?  Thank you

## 2021-07-23 NOTE — ANESTHESIA POSTPROCEDURE EVALUATION
Patient: Hansa Ly  Procedure(s):  CYSTOSCOPY & FULGURATION  AND INJECTION OF INTRALESIONAL KENALOG  Anesthesia type: MAC    Patient location: Phase II Recovery  Last vitals:   Vitals Value Taken Time   /72 05/21/21 1622   Temp 37.3  C (99.1  F) 05/21/21 1537   Pulse 82 05/21/21 1621   Resp 16 05/21/21 1600   SpO2 98 % 05/21/21 1621   Vitals shown include unvalidated device data.  Post vital signs: stable  Level of consciousness: awake and responds to simple questions  Post-anesthesia pain: pain controlled  Post-anesthesia nausea and vomiting: no  Pulmonary: unassisted, return to baseline  Cardiovascular: stable and blood pressure at baseline  Hydration: adequate  Anesthetic events: no    QCDR Measures:  ASA# 11 - Anastasia-op Cardiac Arrest: ASA11B - Patient did NOT experience unanticipated cardiac arrest  ASA# 12 - Anastasia-op Mortality Rate: ASA12B - Patient did NOT die  ASA# 13 - PACU Re-Intubation Rate: NA - No ETT / LMA used for case  ASA# 10 - Composite Anes Safety: ASA10A - No serious adverse event    Additional Notes:

## 2021-07-23 NOTE — PROGRESS NOTES
PPD Reading Note  PPD read and results entered in [facility].  Result: 00 mm induration.  Interpretation: Negative  If test not read within 48-72 hours of initial placement, patient advised to repeat in other arm 1-3 weeks after this test.  Allergic reaction: no

## 2021-07-23 NOTE — TELEPHONE ENCOUNTER
PT DAUGHTER STOPPED BY AND WOULD LIKE PAPERWORK FOR HER MOM FILL OUT AND FAXED [phone] PLEASE CALL PT OR DAUGHTER WHEN DONE PUTTING PAPERWORK IN DR WOLFE MAILBOX

## 2021-07-23 NOTE — OP NOTE
OPERATIVE REPORT    Pre-operative Diagnosis:   1) Bladder lesion    Post-operative Diagnosis: Same    Operative Date:5/21/21    Place of Service: [facility]    Procedure:   1.) cystoscopy  2.) Fulguration of bladder lesion  3.) Injection of Kenalog into bladder lesion    Surgeon: Ellie Wadsworth MD    Assistant: Salma Drake MD -- fellow    Anesthesia: MAC    Estimated Blood Loss: <5 ml    Complications: None    Findings:   1) Cysto revealed an area of ulceration with prior scar noted on the right posterior bladder wall that was somewhat friable. No other lesions noted.  2) Given prior negative biopsy at this site, I elected to proceed with just fulguration and injection of a Kenalog.   3) Uncomplicated injection of the ulcer with ~60 mg in 1.5 cc of Kenalog in 5-6 sites.  Uncomplicated fulguration of the ulceration and surrounding erythema, total area treated ~2-3 cm.    Indication:  Mrs. Ly is a 89-year-old female with h/o ulcerative IC with bladder lesions previously managed with Kenalog. She underwent a biopsy of the area last year with fulguration and injection of Kenalog with great relief of her symptoms. However, her symptoms began to return, and on office cystoscopy an area of recurrent ulceration was noted.  Therefore, I discussed the option of either office treatment or treatment under anesthesia, and she wished to proceed under anesthesia. The risks, benefits, and alternatives were discussed both in clinic and preoperative, including but not limited to risks of anesthesia, bleeding, infection, injury to surround structures. She wished to proceed.    Procedure:   After informed consent was obtained the patient was brought back to the operating room. She was prepped and draped in standard sterile fashion. IV antibiotics were administered for prophylaxis. A time out was performed.  The rigid cystoscope was introduced through the urethral meatus. The bladder was completely inspected.  Cysto revealed an area of ulceration with prior scar noted on the right posterior bladder wall that was somewhat friable. No other lesions noted.  Given prior negative biopsy at this site, I elected to proceed with just fulguration and injection of a Kenalog. Uncomplicated injection of the ulcer with ~60 mg in 1.5 cc of Kenalog in 5-6 sites. There was friable tissue noted and bleeding, so the ulceration and surrounding erythema were fulgurated with Bugbee until the entire area was treated.  Total area treated ~2-3 cm. There was excellent hemostasis noted. The patient's bladder was drained. She tolerated the procedure well. She was brought to the recovery room in stable condition.

## 2021-07-23 NOTE — TELEPHONE ENCOUNTER
Patient will be in office today. Will see where we need to send orders or give paper order to daughter.    Whitley MOTT LPN .......... 9:55 AM  03/06/20

## 2021-07-23 NOTE — PROGRESS NOTES
Hospital Follow-up Visit:      Assessment & Plan     Hansa was seen today for follow-up and follow-up.    Diagnoses and all orders for this visit:    Coronary artery disease involving native coronary artery of native heart without angina pectoris.  Patient was admitted May 29-May 31, 2021, at [facility].  The diagnosis of acute ST segment myocardial infarction.  Angiogram showed thrombotic occlusion of the proximal LAD, large D1 with severe ostial disease, and significant disease mid LAD.  She had PCI of the proximal to mid LAD [Synergy drug-eluting stent x2], and proximal D1 [Synergy drug-eluting stent x1].  Echocardiogram which was done showed ejection fraction 50-55%.  Hypokinetic mid to distal anterior/anteroseptal and apical wall segments.  Mild concentric left ventricular hypertrophy with more pronounced basal septal hypertrophy.  Normal right ventricular size and function.  Mild left atrial enlargement.  Pulmonary artery systolic pressure 37 mmHg pressure.  ECG showed ST segment elevation.  -     Cancel: Basic Metabolic Panel  -     Basic Metabolic Panel; Future    Chronic pain of right ankle  -     acetaminophen (TYLENOL EXTRA STRENGTH) 500 MG tablet; Take 2 tablets (1,000 mg total) by mouth 2 (two) times a day as needed for pain. Take 2 tablets (1000 mg) every morning, and may take another two tablets later on (at least 4-6 hours later) for pain.    Anemia due to other cause, not classified.  History of peptic ulcer disease.  No current abdominal pain, reflux symptoms.  Long-term fatigue.  Patient's daughter told me previously that she was on vitamin B12, but stopped taking these.  We will plan on repeating this at some point, when she has been off these medications for a while.  Gastric ulcer with endoscopy in 2016.  Patient is on omeprazole.  History of irritable bowel syndrome.  Liver enzymes were normal May 5, 2021.   -     Cancel: HM1(CBC and Differential)  -     HM1(CBC and Differential);  Future    Results for NARAYAN HINTON (MRN [id number]) as of 6/2/2021 17:12   Ref. Range 2/18/2020 15:25 5/13/2020 08:10 5/5/2021 14:25   Hemoglobin Latest Ref Range: 12.0 - 16.0 g/dL 11.6 (L) 12.4 10.9 (L)   Hematocrit Latest Ref Range: 35.0 - 47.0 % 36.6 39.8 33.7 (L)   MCV Latest Ref Range: 80 - 100 fL 93 94 94     Latest hemoglobin checked, May 31, 2021 at [facility]: 10.9, this was low at 9.4 May 30, 2021.  MCV normal at 92.2, normal platelets at 241.    Results for NARAYAN HINTON (MRN [id number]) as of 6/2/2021 17:14   Ref. Range 5/13/2020 08:10 5/17/2021 14:42   Vitamin B-12 Latest Ref Range: 213 - 816 pg/mL 420 786       Abnormal liver enzymes.  [facility] on May 31, 2021: Total bilirubin increased to 2.1, direct bilirubin 0.7 which is also elevated.  AST elevated at 155 [this was 215, May 30, 2021], and ALT normal at 43.  Patient appears sallow/ jaundiced, at clinic visit today.  -     Hepatic Profile; Future    History of stroke in the past, 15 years ago, which affected the right side of her body.  Weakness resolved.  No residual effects from the stroke. After admission for STEMI, patient returned home May 31, and both daughters feel she is not herself in terms of her mental status. Sometimes seems out of it, and doesn't answer questions.     Patient has just had surgery, cystoscopy, for interstitial cystitis [Kenalog injections for pain], May 21, 2021.  History of interstitial cystitis since 2008.  Patient did not have any preoperative history of chest pain, or shortness of breath with activity.    Hyponatremia, sodium level 133, May 31, 2021, at [facility], during an admission.    Chronic left-sided low back pain with left-sided sciatica, and right ankle pain which is chronic.  Patient patient uses Tylenol for this. Not complaining of pain today. Discussed previously that she shouldn't use any NSAID's.      {   Return in about 1 month (around 7/2/2021), or 40 minutes with   "Ellie.    Giselle Argueta MD  [facility]   Hansa Ly is a 89 y.o. female who presents for a hospital discharge follow up.    HPI     \"Lie down, feel like need to throw up, and chest hurt, taken within an hour to [facility]. Got in there fast. Eval on the 14th at [facility].  Had two stents placed. On some meds.    Patient was admitted at [facility].  Today's visit was a follow-up, however patient was admitted to [facility], so this is a hospital follow-up visit today.  Patient's daughter, says that she is not as clear as she is usually.  There is some concern regarding this, as this is a change.  I have asked the patient's daughter, to let us know if this is worsening, as we may consider taking her into the emergency department.    Patient's daughter, asks for me to give her a list of her medications, and these are reconciled and updated in the chart today.  I sent a copy with the patient's daughter, for her to give to the facility, where the patient lives.  The daughter would like for the patient to be given medication by the facility, on a scheduled basis, and I agree with this.    BMP at [facility] May 31, 2021: Sodium decreased at 133, potassium 3.9, BUN 22, creatinine elevated at 1.09, estimated GFR 44.  Creatinine 1.03, May 5, 2021, and estimated GFR 51.    Current Outpatient Medications   Medication Sig Dispense Refill     acetaminophen (TYLENOL EXTRA STRENGTH) 500 MG tablet Take 2 tablets (1,000 mg total) by mouth 2 (two) times a day as needed for pain. Take 2 tablets (1000 mg) every morning, and may take another two tablets later on (at least 4-6 hours later) for pain. 100 tablet 2     aspirin 81 MG EC tablet Take 81 mg by mouth daily.       atorvastatin (LIPITOR) 40 MG tablet Take 40 mg by mouth at bedtime.        diclofenac sodium (VOLTAREN) 1 % Gel Apply two times a day to three times a day to the right ankle 100 g 2     " estradiol (ESTRACE) 0.01 % (0.1 mg/gram) vaginal cream Daily for 2 weeks and then every other day afterwards 42.5 g 1     lisinopriL (PRINIVIL,ZESTRIL) 2.5 MG tablet Take 2.5 mg by mouth daily.        metoprolol tartrate (LOPRESSOR) 25 MG tablet Take 12.5 mg by mouth 2 (two) times a day.        multivit-minerals/folic acid (ADULT MULTIVITAMIN GUMMIES ORAL) Take 1 tablet by mouth 2 (two) times a day.       nitroglycerin (NITROSTAT) 0.4 MG SL tablet        omeprazole (PRILOSEC) 20 MG capsule Take 1 capsule (20 mg total) by mouth 2 (two) times a day before meals. 180 capsule 3     polyvinyl alcohol (LIQUIFILM TEARS) 1.4 % ophthalmic solution Apply 1 drop to eye as needed for dry eyes.       ticagrelor (BRILINTA) 90 mg Tab Take 90 mg by mouth 2 (two) times a day.       No current facility-administered medications for this visit.        Hospital/Nursing Home/ Rehab Facility: [facility]  Date of Admission: May 29, 2021  Date of Discharge: May 31, 2021  Reason(s) for Admission: Acute ST segment elevation myocardial infarction.            Do you have any problems taking your medication regularly?   Patient is in a facility, and her daughter wishes for them to take on medication management for the patient.       Have you had any changes in your medication since discharge? None       Have you had any difficulty following your discharge or treatment plan?  No, daughter is involved.    Summary of hospitalization:  Patient was admitted with acute ST segment elevation myocardial infarction.  EKG at the hospital showed ST segment elevation.  She was taken emergently to Cath Lab and had stents placed in the mid LAD and proximal D1.  Ejection fraction 50-55% on transthoracic echocardiogram.  Metoprolol, dual antiplatelet therapy, atorvastatin and lisinopril were started.  Patient was discharged with recommendations to have cardiac rehabilitation.  She is to follow-up with cardiology.  Patient is also known to be prediabetic,  with an HbA1c of 5.7%.  Not on any medications.  Gastroesophageal reflux disease, history of ulcers.  Planning  Antiplatelet therapy for 1 year, aspirin for life.  Started on empiric PPI.  Normocytic anemia, noted in hospital, hemoglobin on discharge 10.9.  Leukocytosis, thought to be due to myocardial infarction.  Chest x-ray clear.  Diagnostic Tests/Treatments reviewed.  Follow up needed: No follow-up tests needed, except for follow-up of hemoglobin, symptoms.  Patient had an echocardiogram May 29, technically difficult exam.  Ejection fraction 50-55%.  [facility]anteroseptal and apical wall segments hypokinetic, mild concentric left ventricular hypertrophy with more pronounced basal septal hypertrophy.  Normal right ventricular size and function.  Mild LA dilatation.  No significant valvular abnormalities.  Estimated pulmonary arterial systolic pressure 37 mmHg.  IVC normal size.  Other Healthcare Providers Involved in Patient's Care: Patient Care Team:  Giselle Argueta MD as PCP - General (Internal Medicine)  Giselle Argueta MD as Assigned PCP      Update since discharge: {improved   Information from family, SNF, care coordination: Family, daughter present today, records from [facility].    Post Discharge Medication Reconciliation: discharge medications reconciled and changed, per note/orders  Plan of care communicated with: patient and family      Electronically signed by Giselle Argueta MD 06/02/21 5:22 PM       Post Discharge Medication Reconciliation Status: discharge medications reconciled and changed, per note/orders.         Review of Systems   All other systems reviewed and are negative.          Objective    /44 (Patient Site: Right Arm, Patient Position: Sitting)   Pulse 70   Ht 5' (1.524 m)   Wt (!) 99 lb (44.9 kg)   SpO2 99%   BMI 19.33 kg/m    Body mass index is 19.33 kg/m .  Physical Exam   Cardiovascular: Normal rate and  regular rhythm.   No murmur heard.  Pulmonary/Chest: No respiratory distress. She has no wheezes. She has no rales.   Musculoskeletal:         General: No edema.   Neurological:   Patient is quiet, she is not as interactive with me as she was in the past.

## 2021-07-23 NOTE — TELEPHONE ENCOUNTER
New Appointment Needed  What is the reason for the visit:  C diff colitis  Inpatient/ED Follow Up Appt Request  At what hospital or facility were you seen?: [facility]  What is the reason you were seen?: uncomfortable pain  What date were you admitted?: date: 11/29  What date were you discharged?: date: 12/3  What was the recommended timeframe for your follow up appointment?: 3-5 days  Provider Preference: PCP only  How soon do you need to be seen?: as soon as available within the 3-5 days recommended  Waitlist offered?: No  Okay to leave a detailed message:  Yes

## 2021-07-23 NOTE — PROGRESS NOTES
[facility] Clinic Note  Hansa Ly   89 y.o. female    Date of Visit: 3/6/2020  Chief Complaint   Patient presents with     Orders Request     Home care Referral for home PT     Hospital Visit Follow Up     Assessment/Plan  1. Osteopenia, unspecified location  Osteopenia noted on CT imaging from 11/2019. daughter will update this writer regarding dose and frequency of calcium and vitamin D supplementation the patient takes  - Ambulatory referral to Home Health  - DXA Bone Density Scan; Future    2. Right ankle tendonitis  - Ambulatory referral to Home Health     4. Atrophic vaginitis  6.  Hx of recurrent urinary tract infections  Reviewed physiology on how this can affect urinary tract infections and counseled patient to please use as prescribed daily for 2 weeks followed by every other day.  If with hydration, cranberry capsules and the cream her symptoms do not improve, then will probably place referral to be seen by gynecology    5. Visual hallucinations  Unsure of etiology.  Initially presumed secondary to UTIs however is now status post treatment and persist.  Is not blind and thus Hank Diaz seems less likely.  Review of medications indicates that she is on ranitidine, however this is not a new medication.  Head CT from 1/27/2020 is not completely normal and does show chronic volume loss and small vessel white matter ischemic changes, which may be related.  Plan is to perform a cognitive eval the clinic next week to assess for any cognitive impairment.     Post Discharge Medication Reconciliation Status: discharge medications reconciled and changed, per note/orders (see AVS)    Much or all of the text in this note was generated through the use of Dragon Dictate voice-to-text software. Errors in spelling or words which seem out of context are unintentional. Sound alike errors, in particular, may have escaped editing  Enrique Lay MD    Return in about 1 week (around  3/13/2020), or if symptoms worsen or fail to improve, for cognitive impairment.    Subjective  This 89 y.o. old female. Presents with her daughter to discuss a few issues.    Firstly they would like a referral for outpatient PT to assist with her right peroneal tendonitis.  Secondly, her recurrent urinary tract infections.  Completed dose of nitrofurantoin for enterococcus UTI diagnosed on 2/18/2020.  Daughter has picked up a medication AXO UTI defense capsules that contains cranberry 500 mg twice daily. Patient is drinking a lot of water.  Still having visual hallucinatons after the nitrofurantoin.  Daughter notes that she met with neurology and had head imaging but unable to find this in the record.  Did provide a printout of her CT head from January 27 2020, residual volume loss and small vessel white matter ischemic changes.  Hallucinations occur daily but do not disturb her and are simple in nature.  Uses prescription glasses and denies blurry or double vision.  Daughter concerned patient is sundowning and thus not using estriol cream as prescribed.  Patient endorses using it periodically but not nightly.  Reviewed notes from urology from 8/2019 indicating atrophic vaginitis.  Lastly, in respect to bone health does not want to take any medications but now amenable to trial of a undergoing a DEXA scan in order to decrease risk of fracture from a fall.  Does not remember when last done.  Asked daughter to update me regarding how much calcium/vitamin D supplementation she takes.  Ambulates with no walker/cane.  Denies any recent falls.    ROS A comprehensive review of systems was performed and was otherwise negative    Medications, allergies, and problem list were reviewed and updated    Exam  General appearance: Pleasant, nontoxic-appearing, no acute distress, alert and oriented x4  Vitals:    03/06/20 1009   BP: 120/72   Pulse: 82   SpO2: 98%   RESPIRATORY: Bilaterally with no crackles, wheezing or  rhonchi  CARDIOVASCULAR: Regular S1 and S2.  Radial pulses intact.  No edema.  GASTROINTESTINAL: NABS, soft, nontender, nondistended  NEUROLOGIC: Alert and oriented to person, place, time, and circumstance. Speech was normal.  Additional Information   Current Outpatient Medications   Medication Sig Dispense Refill     aspirin 81 MG EC tablet Take 81 mg by mouth 2 (two) times a day.       BIOTIN, BULK, MISC Take 1 tablet by mouth 2 (two) times a day.       CALCIUM ACETATE ORAL Take 1 tablet by mouth 2 (two) times a day.       dicyclomine (BENTYL) 20 mg tablet Take 1 tablet (20 mg total) by mouth every 8 (eight) hours as needed (abdominal cramping). 20 tablet 0     estradiol (ESTRACE) 0.01 % (0.1 mg/gram) vaginal cream Daily for 2 weeks and then every other day afterwards 42.5 g 1     fluticasone propionate (FLONASE) 50 mcg/actuation nasal spray Apply 1 spray into each nostril daily.       L.acid/B.bifidum/B.animal/FOS (PROBIOTIC COMPLEX ORAL) Take 1 tablet by mouth 2 (two) times a day.       medium chain triglycerides (MCT OIL ORAL) Take 15 mL by mouth at bedtime.       mv-mn/iron/folic acid/herb 190 (VITAMIN D3 COMPLETE ORAL) Take 1 tablet by mouth 2 (two) times a day.       polyvinyl alcohol (LIQUIFILM TEARS) 1.4 % ophthalmic solution Apply 1 drop to eye as needed for dry eyes.       ranitidine (ZANTAC) 150 MG tablet Take 150 mg by mouth 2 (two) times a day.        No current facility-administered medications for this visit.      Allergies   Allergen Reactions     Sulfa (Sulfonamide Antibiotics) Nausea Only     Adhesive Rash     Social History     Social History Narrative     Not on file     Social History     Tobacco Use     Smoking status: Never Smoker     Smokeless tobacco: Never Used   Substance Use Topics     Alcohol use: Yes     Comment: rare     Drug use: No     Family History   Problem Relation Age of Onset     Heart disease Mother      Colon cancer Father      Breast cancer Sister      Stomach cancer  Sister      Past Surgical History:   Procedure Laterality Date     APPENDECTOMY       HYSTERECTOMY       MI APPENDECTOMY      Description: Appendectomy;  Recorded: 07/18/2008;     MI TOTAL ABDOM HYSTERECTOMY      Description: Hysterectomy;  Recorded: 07/18/2008;     MI TOTAL ABDOM HYSTERECTOMY      Description: Hysterectomy;  Recorded: 07/18/2008;   Time: total time spent with the patient was 40 minutes of which >50% was spent in counseling and coordination of care

## 2021-07-23 NOTE — PATIENT INSTRUCTIONS - HE
1. On the morning of your procedure, take the Prilosec with a small sip of water, and no other medications.

## 2021-07-23 NOTE — PROGRESS NOTES
[facility] Clinic Note  Hansa Ly   89 y.o. female    Date of Visit: 2/3/2020  Chief Complaint   Patient presents with     Establish Care     Urinary Tract Infection     DX on 1/29/20 in [place] - placed on ABX - rechecking - still having hallucinations - lots of re-occuring UTIs       Assessment/Plan  1. Urinary frequency  5. Atrophic vaginitis  Recurrent, but does not have a documented frequency to warrant long-term chronic antibiotic treatment.  Sed rate 11 and WBC 5.5 on 1/29/2020.  2 days earlier on 1/27, BMP was within normal limits.  Provided counseling regarding increasing fluid intake and resuming use of topical estrogen cream for atrophic vaginitis.  Will hold off on recommending cranberry capsules for now. CAM-ICU negative with no inattention or altered level of consciousness.  Hopefully with continued antibiotic use until 2/5/2020, visual hallucinations will improve and then resolve.  If not, the daughters will let us know   - Urinalysis-UC if Indicated  - estradiol (ESTRACE) 0.01 % (0.1 mg/gram) vaginal cream; Daily for 2 weeks and then every other day afterwards  Dispense: 42.5 g; Refill: 1    2. Driving safety issue  - Ambulatory referral to Adult OT- Internal    3. Declining functional status  4. Underweight  Patient quite underweight and weak in the lower extremities extremities.  Daughter is helping with basic ADLs for now.  Likely would benefit from outpatient PT for strengthening.  And encouraged twice daily Ensure with protein supplements and 1 to 2000 units of vitamin D3 daily  - Evaluation for Home Safety    Much or all of the text in this note was generated through the use of Dragon Dictate voice-to-text software. Errors in spelling or words which seem out of context are unintentional. Sound alike errors, in particular, may have escaped editing  Enrique Lay MD    Return in about 1 month (around 3/3/2020), or if symptoms worsen or fail to  improve.    Subjective  This 89 y.o. old female who presents today with 2 daughters Joan and Geeta.  They have a multitude of issues to discuss today but we were able to narrow down to these 3 issues. History pertinent for chronic rhinitis, GERD, recent C. difficile colitis    1) patient primarily lives in [place] and they want to talk about her disposition/handling of a motor vehicle:  [place] live in [facility] [place] [facility], and states she has star independent in her basic ADLs in [place]. Lives alone in [place] and has a place in [place]. Has 7 adult children, all of whom are involved. Does drive in [place].  Denies people have been honking at her and denies getting lost. Has no concerns about her driving. No recent car accidents. Has bifocal and gets them checked regularly. No history of falls.  Does have distant history of stroke with no long-term sequelae.    2) chronic right ankle pain and overall functional status/home safety:  Patient has a history of right ankle sprains as far back as 2018.  Periodically after a long day will have swelling in the right ankle.  Daughter is originally thought it was due to arthritis but after showing prior notes on the situation, communicated is most likely secondary to a strain.  Used to play a lot of tennis as an older adult.  States that the right foot is very painful. States [facility] and a place called [facility] in [place] dx her with sprain.  Has had 2 cortisone injections in the with some improvement.  Has decreased mobility in the area, and they want to know how this can be improved. Takes periodic APAP, but mainly massage receives. Does have difficulty swallowing with pills. Does have a history of a CVA 10 years ago, with no long term sequelae.  Daughter manages basic ADLs and would like a home safety eval.  Denies any falls.    3) Recurrent UTIs and concerned about delirium  Has been on cefdinir since 1/29/20 for UTI.  Culture positive for Klebsiella oxytoca  sensitive to all cephalosporins. Has history of recurrent UTIs.  Urine culture grew E. coli 50-75,000 CFU January 29, 2019.  They states she saw a urologist Dr barraza with tx with intravesical kenalog for ulcerative cystitis. Also dx with postmenopausal atropgic vaginitis with instructions to use nightly X 2 weks, then TIW indefinitely. Endorses seeing people when she has a UTI. UA today is slightly cloudy. No auditory hallucinations. This started about 1 week ago, 2 days before starting the Abx. No other medications except for Zantac.  Review of chart shows urine culture from 11/14/2016 was normal.  Denies fever, sweats or chills.  Endorses chronic poor p.o. intake and small stature.  Actually has gained weight recently.    ROS A comprehensive review of systems was performed and was otherwise negative    Medications, allergies, and problem list were reviewed and updated    Exam  General appearance: Pleasant, nontoxic-appearing, no acute distress, alert and oriented x4  Vitals:    02/03/20 1445   BP: 124/66   Pulse: 71   SpO2: 97%   EYES: Eyelids, conjunctiva, and sclera were normal. Pupils were normal.  HEAD, EARS, NOSE, MOUTH, AND THROAT: Head and face were normal. Hearing was normal to voice. Oropharynx was normal.  No dentures  RESPIRATORY: Bilaterally with no crackles, wheezing or rhonchi  CARDIOVASCULAR: Regular S1 and S2.  Radial pulses intact.  No edema.  GASTROINTESTINAL: NABS, soft, nontender, nondistended, no hepatomegaly  MUSCULOSKELETAL: Skeletal configuration was normal and muscle mass was normal for age. Chronic OA changes in the hands bilaterally.  Brace in the right ankle.  Deferred further ankle exam unfortunately due to time.  SKIN/HAIR/NAILS: Skin color was normal.    NEUROLOGIC: Alert and oriented to person, place, time, and circumstance. Speech was normal. Cranial nerves were normal. 4/5 strength in the bilateral lower extremities with hip abduction, flexion and flexion.  Upper extremity  strength intact 4+/5.  No tremor.  PSYCHIATRIC:  Mood and affect were normal and the patient had normal recent and remote memory. The patient's judgment and insight were normal.   Additional Information   Current Outpatient Medications   Medication Sig Dispense Refill     cefdinir (OMNICEF) 300 MG capsule Take 300 mg by mouth 2 (two) times a day.       ranitidine (ZANTAC) 150 MG tablet Take 150 mg by mouth 2 (two) times a day.        cream base no.39, bulk, (VERSAPRO CREAM BASE) Crea Apply topically. Estriol - Apply 0.5 grams vaginally every other night       medium chain triglycerides (MCT OIL ORAL) Take 15 mL by mouth at bedtime.       polyvinyl alcohol (LIQUIFILM TEARS) 1.4 % ophthalmic solution Apply 1 drop to eye as needed for dry eyes.       No current facility-administered medications for this visit.      Allergies   Allergen Reactions     Sulfa (Sulfonamide Antibiotics) Nausea Only     Adhesive Rash     Social History     Social History Narrative     Not on file     Social History     Tobacco Use     Smoking status: Never Smoker     Smokeless tobacco: Never Used   Substance Use Topics     Alcohol use: Yes     Comment: rare     Drug use: No     Family History   Problem Relation Age of Onset     Heart disease Mother      Colon cancer Father      Breast cancer Sister      Stomach cancer Sister      Time: total time spent with the patient was 40 minutes of which >50% was spent in counseling and coordination of care

## 2021-07-23 NOTE — TELEPHONE ENCOUNTER
Referral Request  Type of referral: Nutritionist   Who s requesting: Patient - Patients daughter called  Why the request: Patient has been having a lot of stomach issues and called requesting appt to see nutrionist but wasn't sure who to call.   Have you been seen for this request: No:  Appointment Offered:  they wanted to speak to nutrionist first since they want to be seen as soon as Monday  Does patient have a preference on a group/provider? No  Okay to leave a detailed message?  Yes

## 2021-07-23 NOTE — PROGRESS NOTES
Your blood count, hemoglobin has come back low at 10.9.  The normal is 12 16.  This could be a reason for your fatigue.  We need to investigate this further, figure out what is causing this.  I will have medical assistant call to arrange an earlier appointment than what previously planned.    Giselle Argueta MD    Team please schedule the next available appointment for this patient, to discuss anemia.  Please keep the other appointment she has in September.  Thank you. Giselle Argueta MD

## 2021-07-23 NOTE — TELEPHONE ENCOUNTER
Patient Returning Call  Reason for call:  Message from clinic  Information relayed to patient:  She needs to make an appointment  Patient has additional questions:  No  If YES, what are your questions/concerns:  n/a  Okay to leave a detailed message?: No call back needed

## 2021-07-23 NOTE — TELEPHONE ENCOUNTER
Triage note:    Daughter called about 89 year old mother.  RN spoke to patient.    She states that she has had one week of diarrhea with mucus in it most times.  Her bottom is raw so there is a little blood when she wipes.  In the last 24 hours she has had about 6 loose stools.  She reports constant mild lower abdominal pain. She reports she has 2 ulcers (bladder, lower abdomen)    RN triaged to go to ED now.  She agreed.  Her daughter will bring her to [facility] ED now.    Elizabeth Cooney RN, [facility] Refill/Triage, 11/29/2019 10:36 AM      Reason for Disposition    Constant abdominal pain lasting > 2 hours    Protocols used: DIARRHEA-A-OH

## 2021-07-23 NOTE — LETTER
Letter by Calin Spangler MD at      Author: Calin Spangler MD Service: -- Author Type: --    Filed:  Encounter Date: 12/24/2019 Status: Signed         Hansa Pacheco [facility] [id number]             December 24, 2019         Dear Ms. Ly,    Below are the results from your recent visit:    Resulted Orders   C. difficile Toxigenic by PCR   Result Value Ref Range    C.Difficile Toxigenic by PCR Negative Negative    Ribotype 027/NAP1/B1 Presumptive Negative Presumptive Negative    Narrative    Intended use:     The [employer] Xpert  C. difficile/Epi Assay is a qualitative in vitro diagnostic test for rapid detection of toxin B gene sequences and for presumptive identification of 027/NAP1/BI strains of toxigenic Clostridium Difficile from unformed stool specimens collected from patients suspected of having C. difficile infection (CDI). The Xpert C. difficile/Epi Assay is intended as an aid in the diagnosis of CDI. Detection of 027/NAP1/BI strains of C. difficile by the Xpert C. difficile/Epi Assay is presumptive and is solely for epidemiological purposes and is not intended to guide or monitor treatment for C. difficile infections. The [employer] Xpert C. difficile/Epi Assay is intended for use in hospital, reference or state laboratory settings.  The device is not intended for point-of-care use.       Methodology:     The [employer] Instrument Systems automate and integrate sample lysis, nucleic acid purification and amplification, and detection of the target sequence in simple or complex samples using real-time polymerase chain reaction (PCR). The [employer] Xpert  C. difficile/Epi assay detects the toxin B gene (tcdB), the binary toxin gene (CDT), and the single-base-pair deletion at nucleotide 117 within the gene encoding a negative regulator of toxin production (tcdC?117). Presumptive identification of 027/NAP1/BI strains of C. difficile is by detection of binary toxin (CDT) gene  sequences and the single base pair deletion at nucleotide 117 in the tcdC gene. The tcdC gene encodes for a negative regulator in C. difficile toxin production. A fluorescent signal becomes detected and increases each time the specific DNA strand is amplified. The Real-time PCR generates a growth curve with number of cycles on the x-axis and fluorescence on the y-axis. If the organism's DNA is not detected by the real-time  PCR reaction the growth curve will be flat and will be resulted as negative.         All very good results and please call pt asap       Please call with questions or contact us using [employer].    Sincerely,        Electronically signed by Calin Spangler MD

## 2021-07-23 NOTE — LETTER
Letter by Deann Richey CHW at      Author: Deann Richey CHW Service: -- Author Type: --    Filed:  Encounter Date: 7/31/2020 Status: (Other)       CARE COORDINATION  [facility]  [address]  [facility] [address]  [place] [address]      August 4, 2020    Hansa Ly  [address] [facility] [id number]      Dear Hansa,    I am a clinic community health worker  who works with Enrique Lay MD at [facility] I have been trying to reach you recently to introduce Clinic Care Coordination and to see if there was anything I could assist you with.  Below is a description of clinic care coordination and how I can further assist you.      The clinic care coordination team is made up of a registered nurse,  and community health worker who understand the health care system. The goal of clinic care coordination is to help you manage your health and improve access to the health care system in the most efficient manner. The team can assist you in meeting your health care goals by providing education, coordinating services, strengthening the communication among your providers and supporting you with any resource needs.    Please feel free to contact me at [phone] with any questions or concerns. We are focused on providing you with the highest-quality healthcare experience possible and that all starts with you.     Sincerely,     Berenice QUINTANILLA  Community Health Worker  [phone]

## 2021-07-23 NOTE — PATIENT INSTRUCTIONS - HE
Patient Instructions by Enrique Lay MD at 3/6/2020 10:20 AM     Author: Enrique Lay MD Service: -- Author Type: Physician    Filed: 3/6/2020 11:27 AM Encounter Date: 3/6/2020 Status: Addendum    : Enrique Lay MD (Physician)    Related Notes: Original Note by Enrique Lay MD (Physician) filed at 3/6/2020 11:16 AM       Vaginal cream aplpicaton: Gently insert applicator filled with vaginal cream deeply into vagina. Cleanse applicator after use with warm water and mild soap (do not boil or use hot water).  Please call the pharmacy regarding getting access to an applicator. Use the cream daily X 2 weeks, then every other day. If this does not improve after 1-2 months, then we can have you see a gynecologist for a second opinion.     Please review the calcium and vitamin D supplements and let us know how many international units of vitamin D and mg of calcium you are taking in a day. I will play a referral for you to to have a DEXA scan     I would like to see you in April for a cognitive assessment. Usually is a 40 minute appointment.     Bone Density Study    A bone density study helps diagnose osteoporosis (bone thinning). Scans of your lower back, hip, or forearm are taken to measure the amount of calcium (density) in your bones. Calcium is the mineral that makes up your bones.    Before your test    Wear clothing without metal closures, such as zippers or metal buttons.    Bring a list of medications that you take.  During your test    You will lie on a table or sit.    Your lower legs may be raised on a platform.    A scanner arm moves back and forth over the part of your body being scanned.    Remain still and do not talk during the scan.    Follow instructions to help prevent the need for a second exam.  After your test    You may need to wait briefly while the images are reviewed.    Your healthcare provider will discuss the test results with you  during a follow-up visit or over the phone.    [facility] CT SCAN OF THE BRAIN  RESULTS FROM 2020  [facility], [place] [address]  ________________________________________________________________________________    Patient Name: LOW HINTON  MRN: [id number]       : 1928  Requesting Physician: AMBAR VALADEZ  Exam: CT HEAD BRAIN WO CONTRAST                        Date Completed: 2020 04:25 PM    TECHNIQUE:  CT HEAD BRAIN WO CONTRAST .    This exam was performed using one or more of the following dose reduction  techniques: Automated exposure control, adjustment of the mA and/ or kV  according to patient size or use of iterative reconstruction technique.    COMPARISON: CT of the brain 10/19/2009    HISTORY:  89 years Female  Headache, acute, normal neuro exam        FINDINGS:    Supra tentorial compartment and Posterior fossa: No hemorrhage.  No  intra-axial mass.  No midline shift.  Volume loss and small vessel white matter ischemic changes.    Extra-axial structures: No hematoma, fluid collection, or mass.    Ventricles and Basal Cisterns: No hydrocephalus.  Basal cisterns are  normal.    Visualized Orbits: No abnormalities.    Visualized Osseous structures: Within normal limits.    Visualized paranasal Sinuses: Within normal limits.    Other: None.    IMPRESSION:  There is no hemorrhage.    Volume loss and small vessel white matter ischemic changes.      Dictated byBernabe at 2020 4:27 PM    Dictated: 2020 16:29:29    AUTHENTICATED BY:  Bernabe Talavera MD  2020 16:32:00   Other Result Information   Interface, Ris Results In - 2020  4:32 PM EST  IMPRESSION:  [facility], [place] [address]  ________________________________________________________________________________    Patient Name: LOW HINTON  MRN: [id number]       : 1928  Requesting Physician: AMBAR VALADEZ  Exam: CT HEAD BRAIN WO  CONTRAST                        Date Completed: 01/27/2020 04:25 PM    TECHNIQUE:  CT HEAD BRAIN WO CONTRAST .    This exam was performed using one or more of the following dose reduction  techniques: Automated exposure control, adjustment of the mA and/ or kV  according to patient size or use of iterative reconstruction technique.    COMPARISON: CT of the brain 10/19/2009    HISTORY:  89 years Female  Headache, acute, normal neuro exam        FINDINGS:    Supra tentorial compartment and Posterior fossa: No hemorrhage.  No  intra-axial mass.  No midline shift.  Volume loss and small vessel white matter ischemic changes.    Extra-axial structures: No hematoma, fluid collection, or mass.    Ventricles and Basal Cisterns: No hydrocephalus.  Basal cisterns are  normal.    Visualized Orbits: No abnormalities.    Visualized Osseous structures: Within normal limits.    Visualized paranasal Sinuses: Within normal limits.    Other: None.    IMPRESSION:  There is no hemorrhage.    Volume loss and small vessel white matter ischemic changes.      Dictated byBernabe at 1/27/2020 4:27 PM    Dictated: 01/27/2020 16:29:29    AUTHENTICATED BY:  Bernabe Talavera MD  01/27/2020 16:32:00

## 2021-07-23 NOTE — TELEPHONE ENCOUNTER
New Appointment Needed  What is the reason for the visit:    Pre-Op Appt Request  When is the surgery? :  5/21/21  Where is the surgery?:  [facility]   Who is the surgeon? :  Dr. Wadsworth  What type of surgery is being done?: Bladder   Provider Preference: Any available  How soon do you need to be seen?: today  Waitlist offered?: Yes  Okay to leave a detailed message:  Yes

## 2021-07-23 NOTE — TELEPHONE ENCOUNTER
Form placed on [facility] desk to review and give to MD.    Milagros Joseph LPN

## 2021-07-23 NOTE — TELEPHONE ENCOUNTER
This could be a recurrence of C. difficile toxin associated diarrhea.    Patient needs stool study for C. difficile toxin.    Patient needs vancomycin 125 mg tablets number 40 tablets take 1 tablet 4 times a day.    If no better in a week I would recommend office visit with our clinic or [facility] Dr. Vignesh Gilbert or associate at [phone].

## 2021-07-23 NOTE — TELEPHONE ENCOUNTER
Orders being requested: Home Care   Physical Therapy    Assessment and Treat  Reason service is needed/diagnosis: Caller stated these orders need to be updated as the prior order was written for out patient PT and a nursing assessment.  Patient agrees to do Home Care Physical Therapy but denied nursing.   When are orders needed by: ASAP requesting today  Where to send Orders: Phone:  verbal orders to caller  Okay to leave detailed message?  Yes

## 2021-07-23 NOTE — TELEPHONE ENCOUNTER
RN triage   Call back from pt  Pt upset -- had not gotten a call yet -- wants call backs to her home number = [phone]  Reviewed PCP advice = see below  Pt had many watery stools yesterday and overnight -- has had one watery stool this AM   No pain today  Reviewed hydration - home care advice   Pt will have family  vanco -- and start today   Pt wants to know :  1. Can she take imodium?  2. Can she  stool containers and drop off sample at [facility] -- closer to pt home ?  Please advise   Geeta Rangel RN [facility] RN triage

## 2021-07-23 NOTE — PATIENT INSTRUCTIONS - HE
For the ankle, ice, rest, ankle brace, and soaking in epsom salt if helpful    Please stop taking Flonase     Drink more water    Resume using the Estriol cream as Rx    Please let us know the headaches or hallucinations do not improve/salt    Consider taking Ensure Protein once or twice a day to help with weight gain.   Consider 1000 to 2000 units vitamin D daily  Consider calcium 600 mg once or twice a day

## 2021-07-23 NOTE — TELEPHONE ENCOUNTER
FNA triage call : Chronic UTI's and lives normally in [facility]    Presenting problem : Pt called. Diarrhea start 12 hours , no fever .  Seen at [facility] office and say Enrique Lay MD- IM  - Dx UTI's on 2/3/20 and working on assisted living thru PCP.Currently: tired , not fever , walking unassisted to bathroom , scant amount of bright red stool  ( just blood )  x 1 now ,     Guideline used : Diarrhea A OH .   Disposition and recommendations : ED now.  Daughter and Pt agrees to go to [facility] ,   Caller verbalizes understanding and denies further questions and will call back if further symptoms to triage or questions  . Nazanin Vela RN  - [facility] Nurse Advisor     Reason for Disposition    Bloody, black, or tarry bowel movements    Protocols used: DIARRHEA-A-OH

## 2021-07-23 NOTE — TELEPHONE ENCOUNTER
Left message to call back for: Amie/Daughter  Information to relay to patient:  Calling to ask how patient is doing after her ER visit.    They had an OV scheduled that was cancelled.    Dr. Lay just wanted to check on the patient to see how she is doing.    Whitley MOTT LPN .......... 2:06 PM  02/19/20

## 2021-07-23 NOTE — TELEPHONE ENCOUNTER
.  Chief Complaint   Patient presents with   • Annual Exam         HPI:  Jaskaran is a 70 y.o. here for Medicare Annual Wellness Visit and otherwise he is doing quite well. Still smoking about a pack a day. Is not interested in the tobacco cessation program or lung cancer screening program. Eczema is controlled. No need for new steroid medication. Even though in the past he was diagnosed with COPD and emphysema he is doing well. He is no rescue inhaler. Denies any shortness of breath or chest pain. Vitamin D level in the past was low at 22 with doesn't want to take over-the-counter supplements.    Patient Active Problem List    Diagnosis Date Noted   • Flexural eczema 07/13/2017   • Tobacco dependence 07/13/2017   • Vitamin D deficiency 07/13/2017   • Pulmonary emphysema (CMS-[facility]) 07/13/2017       Current Outpatient Prescriptions   Medication Sig Dispense Refill   • betamethasone valerate (VALISONE) 0.1 % Ointment Apply twice daily as needed. 45 g 3     No current facility-administered medications for this visit.         Patient is taking medications as noted in medication list.  Current supplements as per medication list.     Allergies: Patient has no known allergies.    Current social contact/activities: Spending time with family, hiking, fishing, traveling.    Patient's perception of their health: good    Is patient current with immunizations? No, due for PNEUMOVAX (PPSV23) and ZOSTAVAX (Shingles). Patient is interested in receiving NONE today.    He  reports that he has been smoking.  He has never used smokeless tobacco. He reports that he does not drink alcohol or use drugs.  Ready to quit: Not Answered  Counseling given: Not Answered        DPA/Advanced directive: Patient has Durable Power of , but it is not on file. Instructed to bring in a copy to scan into their chart.    ROS:    Gait: Uses no assistive device   Ostomy: no   Other tubes: no   Amputations: no   Chronic oxygen use no   Last eye exam 3  Thank you for the update   years ago.    Wears hearing aids: no   : Denies any urinary leakage during the last 6 months      Depression Screening    Little interest or pleasure in doing things?  0 - not at all  Feeling down, depressed, or hopeless? 0 - not at all  Patient Health Questionnaire Score: 0    If depressive symptoms identified deferred to follow up visit unless specifically addressed in assessment and plan.    Interpretation of PHQ-9 Total Score   Score Severity   1-4 No Depression   5-9 Mild Depression   10-14 Moderate Depression   15-19 Moderately Severe Depression   20-27 Severe Depression    Screening for Cognitive Impairment    Three Minute Recall (apple, watch, see)  2/3    Draw clock face with all 12 numbers set to the hand to show 10 minutes past 11 o'clock  1 Apple, table, pen   If cognitive concerns identified, deferred for follow up unless specifically addressed in assessment and plan.    Fall Risk Assessment    Has the patient had two or more falls in the last year or any fall with injury in the last year?  No  If fall risk identified, deferred for follow up unless specifically addressed in assessment and plan.    Safety Assessment    Throw rugs on floor.  Yes  Handrails on all stairs.  Yes  Good lighting in all hallways.  Yes  Difficulty hearing.  No  Patient counseled about all safety risks that were identified.    Functional Assessment ADLs    Are there any barriers preventing you from cooking for yourself or meeting nutritional needs?  No.    Are there any barriers preventing you from driving safely or obtaining transportation?  No.    Are there any barriers preventing you from using a telephone or calling for help?  No.    Are there any barriers preventing you from shopping?  No.    Are there any barriers preventing you from taking care of your own finances?  No.    Are there any barriers preventing you from managing your medications?  No.    Are there any barriers preventing you from showering, bathing or  "dressing yourself?  No.    Are you currently engaging any exercise or physical activity?  Yes.  Walking    Health Maintenance Summary                Annual Wellness Visit Overdue 1947     IMM ZOSTER VACCINE Overdue 10/18/2007     IMM PNEUMOCOCCAL 65+ (ADULT) LOW/MEDIUM RISK SERIES Overdue 10/18/2012     IMM DTaP/Tdap/Td Vaccine Overdue 10/4/2014      Done 10/3/2014 Imm Admin: TD Vaccine    COLONOSCOPY Next Due 11/1/2022      Done 11/1/2012 10 year surveillance advised          Patient Care Team:  Nate Burgess P.A.-C. 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   History reviewed. No pertinent surgical history.        Exam:     Blood pressure 124/72, pulse 79, temperature 36.2 °C (97.1 °F), resp. rate 16, height 1.676 m (5' 6\"), weight 69.4 kg (153 lb), SpO2 96 %. Body mass index is 24.69 kg/m².    Hearing good.    Dentition poor  Alert, oriented in no acute distress.  Eye contact is good, speech goal directed, affect calm      Assessment and Plan. The following treatment and monitoring plan is recommended: #1. Pulmonary emphysema. Chronic condition. Stable. Provided no medication. He is not interested in medication. Doesn't want follow-up. #2. Eczema. Chronic condition. Controlled. Continue use of steroid cream as directed. Contact me for refills. #3. Tobacco dependency. Offered referral to lung cancer screening and tobacco cessation programs but he declined. #4. Vitamin D deficiency. Advised to take over-the-counter vitamin D supplements. He declined. " We'll monitor in the future. We will also try to obtain previous medical records which include his vaccine record.  Received labs and colonoscopy report in the past.      Services suggested: No services needed at this time  Health Care Screening: Age-appropriate preventive services recommended by USPTF and ACIP covered by Medicare were discussed today. Services ordered if indicated and agreed upon by the patient.  Referrals offered: PT/OT/Nutrition counseling/Behavioral Health/Smoking cessation as per orders if indicated.    Discussion today about general wellness and lifestyle habits:    · Prevent falls and reduce trip hazards; Cautioned about securing or removing rugs.  · Have a working fire alarm and carbon monoxide detector;   · Engage in regular physical activity and social activities       Follow-up: No Follow-up on file.

## 2021-07-23 NOTE — TELEPHONE ENCOUNTER
Okay for Imodium right ear over-the-counter 1 tablet 2 or 3 times daily maximum as needed for severe diarrhea.    Suggest also patient follow the bananas rice applesauce and tea diet.  Avoiding milk and dairy products and fruit juices.  Gatorade would be good.  Or chilled Pedialyte

## 2021-07-23 NOTE — TELEPHONE ENCOUNTER
Spoke with [facility] and they overlooked the PT order and will re-open the patients case to start PT.    Patient's daughter informed today at appointment.    Whitley MOTT LPN .......... 11:35 AM  03/13/20

## 2021-07-23 NOTE — TELEPHONE ENCOUNTER
Spoke to pt's daughter Joan and I will send labs requested for them to bring with to cardiologist appt at [facility] on 6/14. They will have them drawn there and fax us the results.

## 2021-07-23 NOTE — TELEPHONE ENCOUNTER
Orders being requested: pt 1 time a week for 3 weeks   Reason service is needed/diagnosis: balance and ankle tendonitis   When are orders needed by: as soon as possible   Where to send Orders: Phone:  [phone]  Okay to leave detailed message?  Yes

## 2021-07-23 NOTE — TELEPHONE ENCOUNTER
Scheduled appt for 7/28 at 8:40 AM as noted below.    Whitley MOTT LPN .......... 9:58 AM  07/21/20  [facility]

## 2021-07-23 NOTE — TELEPHONE ENCOUNTER
----- Message from Giselle Argueta MD sent at 5/6/2021  9:19 AM CDT -----  Your blood count, hemoglobin has come back low at 10.9.  The normal is 12 16.  This could be a reason for your fatigue.  We need to investigate this further, figure out what is causing this.  I will have medical assistant call to arrange an earlier appointment than what previously planned.    Giselle Argueta MD    Team please schedule the next available appointment for this patient, to discuss anemia.  Please keep the other appointment she has in September.  Thank you. Giselle Argueta MD

## 2021-07-23 NOTE — ANESTHESIA PREPROCEDURE EVALUATION
Anesthesia Evaluation      Patient summary reviewed   No history of anesthetic complications     Airway   Mallampati: II  Neck ROM: full   Pulmonary - normal exam   (+) pneumonia,                          Cardiovascular - normal exam  (+) , hypercholesterolemia,        ROS comment: Anemia (10.9).     Neuro/Psych    (+) neuromuscular disease,  CVA ,     Endo/Other - negative ROS      GI/Hepatic/Renal    (+)   chronic renal disease CRI,     Comments: Dysuria; KASSIDY.  Interstitial cystitis.  IBS.     Other findings: PONV      Dental - normal exam   (+) poor dentition                         Anesthesia Plan  Planned anesthetic: MAC  Decadron, Zofran.  Diprivan infusion.  Ketamine (0.5 mg/kg).  Magnesium.  ASA 3     Anesthetic plan and risks discussed with: patient  Anesthesia plan special considerations: antiemetics,   Post-op plan: routine recovery          Chemistry        Component Value Date/Time     05/05/2021 1425    K 4.2 05/05/2021 1425     05/05/2021 1425    CO2 26 05/05/2021 1425    BUN 26 05/05/2021 1425    CREATININE 1.02 05/05/2021 1425    GLU 84 05/05/2021 1425        Component Value Date/Time    CALCIUM 9.2 05/05/2021 1425    ALKPHOS 73 05/05/2021 1425    AST 18 05/05/2021 1425    ALT 11 05/05/2021 1425    BILITOT 0.5 05/05/2021 1425        Lab Results   Component Value Date    WBC 6.6 05/05/2021    HGB 10.9 (L) 05/05/2021    HCT 33.7 (L) 05/05/2021    MCV 94 05/05/2021     05/05/2021

## 2021-07-23 NOTE — TELEPHONE ENCOUNTER
Trial of over-the-counter cranberry capsules (typically comes in 400 to 450 mg capsules) twice a day.  Hydration, good genitourinary hygiene are the other interventions.  Prophylactic antibiotic is recommended for severe symptoms and/or 4+ documented diagnoses of UTIs in a span of 12 months.  It is important to note that the patient has a allergy to Bactrim and history of C. difficile just in November 2019.  I reviewed the urine culture results from [facility] done on 1/29/2020.  Klebsiella Oxytoca grew in the urine.  Was susceptible to ceftriaxone's, including the Cefdinir that the patient was prescribed.  We can try nitrofurantoin 100 mg twice daily x5 days, until culture results come back.

## 2021-07-23 NOTE — TELEPHONE ENCOUNTER
.Reason for Call:  Home Health Care    Arabella with [facility] called regarding (reason for call): Orders to be fax: [phone]    Orders are needed for this patient. PT/OT    PT: Eval & treat     OT: Eval & treat     Skilled Nursing: N/A    Pt Provider: Giselle Argueta MD    Phone Number Homecare Nurse can be reached at: [phone]    Call taken on 6/4/2021 at 3:08 PM by Brenda Matt

## 2021-07-23 NOTE — TELEPHONE ENCOUNTER
Dr. Lay,  Left message for the patient's daughter that you could see the patient for a pre-op on Tuesday, 7/28/2020 at 8:40 am.  Okay for patient to wait until then to be seen or would a virtual pre-op work?  Please advise.  Thank you.  Ivonne BROOKS CMA/NATHAN....................9:18 AM

## 2021-07-23 NOTE — PROGRESS NOTES
Office Visit - Follow up    Hansa Ly   89 y.o. female    Date of Visit: 8/22/2017    Chief Complaint   Patient presents with     Follow-up     gastric ulcer- saw Dr. Tran     Medication Questions     Zantac 150mg bid     Ankle Pain     right       Subjective: Acute sore throat.  Recent upper GI was noted to have a gastric ulcer with irritation.  The patient was seen then by Dr. Adriel Tran the patient's gastric ulcer was treated in the usual fashion the patient is allergic to sulfa and as well as adhesive.  The patient is on low-dose aspirin 81 mg she takes 2 daily also ranitidine or Zantac 150 mg twice daily the patient's abdominal pain is slightly better.  Her hoarse voice was previously evaluated by ear nose and throat specialist with direct laryngoscopy.  I encouraged high fiber high fluid intake as vocal cord sometimes with advanced age of 89 as this patient has have some drying effect.    No blood in stool or urine no medication problems current meds are well-tolerated.  No ill effects.    ROS: A comprehensive review of systems was performed and was otherwise negative    Medications:  Prior to Admission medications    Medication Sig Start Date End Date Taking? Authorizing Provider   aspirin 81 MG EC tablet Take 81 mg by mouth daily. Two daily   Yes PROVIDER, HISTORICAL   CYANOCOBALAMIN, VITAMIN B-12, (VITAMIN B-12 ORAL) Take by mouth.   Yes PROVIDER, HISTORICAL   ranitidine (ZANTAC) 150 MG tablet Bid 8/7/17  Yes PROVIDER, HISTORICAL   cholecalciferol, vitamin D3, (VITAMIN D3) 1,000 unit capsule Take 1,000 Units by mouth daily.    PROVIDER, HISTORICAL   aspirin 325 MG tablet Take 325 mg by mouth daily.  8/22/17  PROVIDER, HISTORICAL       Allergies:   Allergies   Allergen Reactions     Sulfa (Sulfonamide Antibiotics) Nausea Only     Adhesive Rash       Immunizations:   Immunization History   Administered Date(s) Administered     DT (pediatric) 01/01/2001     Influenza, inj, historic 10/11/2013      Influenza, seasonal,quad inj 6-35 mos 09/14/2010     Pneumo Conj 13-V (2010&after) 12/20/2016     Pneumo Polysac 23-V 08/04/1999       Exam Chest clear to auscultation and percussion.  Heart tones regular rhythm without murmur rub or gallop.  Abdomen soft nontender no organomegaly.  No peritoneal signs.  Extremities free of edema cyanosis or clubbing.  Neck veins nondistended no thyromegaly or scleral icterus noted, carotids full.  Skin warm and dry easily conversant good spirited.  Normal intelligence.  Neurologically intact no gross localizing findings.    Assessment and Plan  Gastric ulcer seen in upper GI with history of hoarse voice and prior ear nose and throat consultation and prior EGD testing.  Heartburn uses vinegar preparation.  Dr. Adriel Tran presiding from [facility] upper GI endoscopy report reviewed.  Date of endoscopy upper GI tract August 8, 2017.    Right ankle pain questionable related to prior injury versus osteoarthritis.  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  Disability form for parking done    Hyperlipidemia needs lipid panel with next office visit spring 2018.    Time: total time spent with the patient was 25 minutes of which >50% was spent in counseling and coordination of care        Calin Spangler MD    Patient Active Problem List   Diagnosis     Abdominal Pain     Hyperlipidemia     Constipation     Irritable Bowel Syndrome     Female Stress Incontinence     Limb Pain     Sore Throat     Neck Pain

## 2021-07-23 NOTE — TELEPHONE ENCOUNTER
Patient Returning Call  Reason for call:  Daughter called back.  Information relayed to patient:  Informed of Dr Lay's message listed below.  Daughter states patient is very tired. States she still has a UC lab that is still in process and has not gotten a call yet.  Patient has additional questions:  Yes  If YES, what are your questions/concerns:    Okay to leave a detailed message?: No call back needed

## 2021-07-23 NOTE — TELEPHONE ENCOUNTER
Patient is having a urological procedure next week 5/21/2021 and needs a preoperative H+P.     Giselle Argueta MD

## 2021-07-23 NOTE — TELEPHONE ENCOUNTER
Based on what I see, it appears patient might have another UTI according to the UA findings.  Also had a mild leukocytosis.  It appears they did not discharge her on antibiotics.  Is it something she would like us to trial and then wait to see what the final culture shows?

## 2021-07-23 NOTE — TELEPHONE ENCOUNTER
Faxed all paperwork again to fax number.    It was also faxed on 2/10/20.    Told Vy to call us if she does not get the fax this time.     Whitley MOTT LPN .......... 4:21 PM  02/12/20

## 2021-07-23 NOTE — TELEPHONE ENCOUNTER
"Pt reports she was hospitalized for c-diff and \"they let me out after four days and I wasn't better yet\". Per chart pt discharged 12/3/19, continuing to have GI issues and c-diff test sent to lab this keisha. Pt states \"I would like them to know I did do the sample and if they can get it done as soon as possible that would be good\". Pt anxious to go to [place].     Advised pt someone will contact her as soon as labs are back but test may take a few days. Call back if any new concerns arise.     Pt verbalizes understanding and requests a call back as soon as results are back.       Reason for Disposition    Caller requesting lab results    Protocols used: PCP CALL - NO TRIAGE-A-AH      "

## 2021-07-23 NOTE — TELEPHONE ENCOUNTER
Arabella from [facility] calling for some verbal orders.    Verbal orders needed for Physical Therapy 2 times a week for two weeks, and one time a week for two weeks.    Okay per Anne. Arabella notified.  Manju Romero CMA ............... 8:55 AM, 06/09/21

## 2021-07-23 NOTE — TELEPHONE ENCOUNTER
"Dr. Lay,    Spoke with daughter Joan and she does want to start an antibiotic for her mother regarding the suspected UTI.    Please send to [facility] in [place].    She is also wondering what else they can do about the reoccurring UTI's. They are having her drink good fluids, eat healthy, etc.     She is wondering if a prophylactic ABX is needed or does she need to see a urologist? Daughter, Joan stated she \"is at her wits end\" regarding her mothers UTI's.    Whitlye MOTT LPN .......... 3:08 PM  02/19/20     "

## 2021-07-23 NOTE — PATIENT INSTRUCTIONS - HE
I need the neurology notes    Please stop taking the ranitidine. Let us know if you are having VJ symptoms. If not daily, consider taking TUMS instead    I need to know what time of the day the visions occur, are they related to any kind of headaches and what head ache symptoms you are having, and lastly, is it in one eye or both eyes.     You have cognitive impairment, this is something we need to check yearly, and sometimes every 6 months. Ideally next time, please come with both your glasses and your hearing aids. This can mean a form of mild dementia and basically means that you need more help with thins and your family will help you with that.     I have placed a referral for the gynecologist    Regarding the simple and complex hallucinations  Simple visual hallucinations include images of unformed light, geometric shapes, or designs. Complex visual hallucinations are formed images of people, animals, or scenes.    Causes of visual hallucinations include retinal disease, migraine, vision loss, neurodegenerative disease, alcohol and drug use, psychiatric illness, and toxic-metabolic encephalopathy.

## 2021-07-23 NOTE — PROGRESS NOTES
[facility] Clinic Note  Hansa Ly   89 y.o. female    Date of Visit: 2/7/2020  Chief Complaint   Patient presents with     Paperwork     POLST and custodial forms     Assessment/Plan  1. Encounter for examination for admission to assisted living facility  I think she would do quite well in an [facility].  Only needs social stimulation but otherwise independent in basic and instrumental ADLs.  Will come back on Monday to 10/20/2024 review of PPD.  DNR and no feeding tube but amenable to mechanical ventilation and IV/IM antibiotics.  Once PPD is done, POLST form will be completed, with copy mailed to the daughter, one sent to the custodial and copy scanned into the EMR  - TB Skin Test    2. Visual hallucinations  Likely in the setting of UTI. Not delirious and doubt related to antihistamine components in the ranitidine. Hopefully will improve with UTI symptoms have not been treated, however family will still have patient follow-up with a neurologist.  Reviewed report from CT head on 1/27/2020 that did show some volume loss and small vessel white matter ischemic changes but otherwise no hydrocephalus or other major abnormalities.    3. Underweight  Reportedly never been a large person.  Counseled to start taking Ensure with protein nutritional supplements twice a day to help improve caloric intake.  Also start taking vitamin D and calcium supplements to help improve bone health.  B12 within normal limits.  Deferred from checking vitamin D level today.    4. Hx of hyperlipidemia   in May 2015, but unsure what her weight was at that time.  Is 89 years old and did not tolerate statins in the past due to myalgias.  Was on 325 mg aspirin which was discontinued to 81 mg twice daily in the setting of a gastric ulcer for which she takes Zantac.  Counseled daughter that they should bring this topic up with neurologist when she sees him on 2/10/2020 and whether or not she should be back on daily or twice daily  aspirin 81 mg.     Much or all of the text in this note was generated through the use of Dragon Dictate voice-to-text software. Errors in spelling or words which seem out of context are unintentional. Sound alike errors, in particular, may have escaped editing  Enrique Lay MD    Return if symptoms worsen or fail to improve.    Subjective  This 89 y.o. old female with her daughter Joan. Had a disposition discussion with the family and plan is to live in an assisted living facility, 5 minutes from her Joan and son Aroldo. Will need help with meal prep, and not need help with managing her medications.  Otherwise independent in her basic ADLs.  All her 3 children will be involved in her care. The main goal is improve socialization, as she is currently getting little stimulation/activity. They are looking at the [facility] on [address] in [place], [place] and after he reviewed the residents. They will not yet be selling her place in [place].   Will be DNR but amenable to intubation and antibiotics.  No nutrition via feeding tube.  Will undergo PPD placement today and recheck on Monday before 2 PM.  Would like to be seen in clinic every 6 to 12 months.  No falls, and declined DEXA scan as she would prefer not to be on bisphosphonate therapy.  Amenable to trial of vitamin D and calcium supplementation moving forward.  Continues to have periodic swelling in the right ankle but has obtained a more dedicated ankle brace.  Reviewed ankle x-ray from 1/6/2020 which indicated no fracture and only mild joint space narrowing at the ankle/Talar mortise.  Has completed antibiotic course for her UTI, and states that her visualizations have significantly improved but still occur.  Trying to stay well-hydrated.  Daughter will be taking to neurologist to evaluate for the hallucinations.  B12 > 2000 on 1/29/19  Daughter Geeta's trial Glucerna, in light of my request for patient to increase intake of nutritional supplements with protein  in the setting of underweight status.  Continues to take ranitidine for GERD    ROS A comprehensive review of systems was performed and was otherwise negative    Medications, allergies, and problem list were reviewed and updated    Exam  General appearance: Pleasant, nontoxic-appearing, no acute distress, alert and oriented x4  Vitals:    02/07/20 1408   BP: 124/66   Pulse: 81   SpO2: 98%   EYES: Eyelids, conjunctiva, and sclera were normal.   RESPIRATORY: Bilaterally with no crackles, wheezing or rhonchi  CARDIOVASCULAR: Regular S1 and S2.  Radial pulses intact.   MUSCULOSKELETAL:  1+ edema in the right ankle, with slight tenderness along the lateral aspect of the talar bone but no erythema  NEUROLOGIC: Alert and oriented to person, place, time, and circumstance.   PSYCHIATRIC:  Mood and affect were normal and the patient had normal recent and remote memory. The patient's judgment and insight were normal.  Additional Information   Current Outpatient Medications   Medication Sig Dispense Refill     cream base no.39, bulk, (VERSAPRO CREAM BASE) Crea Apply topically. Estriol - Apply 0.5 grams vaginally every other night       estradiol (ESTRACE) 0.01 % (0.1 mg/gram) vaginal cream Daily for 2 weeks and then every other day afterwards 42.5 g 1     medium chain triglycerides (MCT OIL ORAL) Take 15 mL by mouth at bedtime.       polyvinyl alcohol (LIQUIFILM TEARS) 1.4 % ophthalmic solution Apply 1 drop to eye as needed for dry eyes.       ranitidine (ZANTAC) 150 MG tablet Take 150 mg by mouth 2 (two) times a day.        No current facility-administered medications for this visit.      Allergies   Allergen Reactions     Sulfa (Sulfonamide Antibiotics) Nausea Only     Adhesive Rash     Social History     Social History Narrative     Not on file     Social History     Tobacco Use     Smoking status: Never Smoker     Smokeless tobacco: Never Used   Substance Use Topics     Alcohol use: Yes     Comment: rare     Drug use: No      Family History   Problem Relation Age of Onset     Heart disease Mother      Colon cancer Father      Breast cancer Sister      Stomach cancer Sister    Time: total time spent with the patient was 40 minutes of which >70% was spent in counseling and coordination of care, in respect to expectations with transitioning to an assisted-living facility.  We reviewed the POLST form in depth, and address CODE STATUS, mechanical ventilation, use of antibiotics and feeding tube use.  Also discussed improving but yet persistent visual hallucinations, and plan to follow-up with neurology for further work-up.  Discussed bone health and need to start calcium and vitamin D supplementation.  Lastly we reviewed  the work-up for her ankle injury and recommendation to trial physical therapy exercises to help strengthening and stretch the ligaments and supportive muscles for the ankle joint.

## 2021-07-23 NOTE — TELEPHONE ENCOUNTER
Patient called back and per msg below has been scheduled with Dr. Spangler for 11:20 Friday 12/6. Liz Velazquez CMA ([facility]) 10:26 AM

## 2021-07-23 NOTE — PROGRESS NOTES
Community Health Worker called and left a message for the patient.  If the patient is returning my call, please transfer the patient to [facility] at ext. [phone].   Patient has been mailed a unreachable letter and was provided with CHW contact information if they are interested in accessing Clinic Care Coordination.  Order for Care Management has been closed, no further outreach will be done at this time and patient can be re-referred.   Covering CHW mailed letter in absence of primary CHW    Clinic Care Coordination Contact  [facility]/Voicemail       Clinical Data: Care Coordinator Outreach  Outreach attempted x 1.  [facility] back information and requested return call.  Plan: Care Coordinator Discuss and offer CCC enrollment   Care Coordinator will try to reach patient again in 1-2 business days.8/3/2020

## 2021-07-23 NOTE — PROGRESS NOTES
Your folate level is normal.  Your vitamin B12 level is normal, your ferritin [which looks at iron stores], is normal.  Other iron studies are normal.  This means that an iron deficiency is not the cause of your anemia.  I would like to look for other reasons for the anemia [decreased hemoglobin, which is 10.9, a normal level is 12 14].    Giselle Argueta MD

## 2021-07-23 NOTE — ADDENDUM NOTE
Addendum Note by Chau Rizo MD at 12/21/2019  2:08 PM     Author: Chau Rizo MD Service: -- Author Type: Physician    Filed: 12/21/2019  2:08 PM Encounter Date: 12/20/2019 Status: Signed    : Chau Rizo MD (Physician)    Addended by: CHAU RIZO on: 12/21/2019 02:08 PM        Modules accepted: Orders

## 2021-07-23 NOTE — TELEPHONE ENCOUNTER
Received notes and placed on Dr. Lay's desk to review.    Whitley MOTT LPN .......... 9:57 AM  03/20/20

## 2021-07-23 NOTE — PROGRESS NOTES
Office Visit - Follow up    Hansa Ly   89 y.o. female    Date of Visit: 8/8/2018    Chief Complaint   Patient presents with     Follow-up     aphasia about 15min, followed by headache, started on Gabapentin-has had differt kind of headache on it       Subjective: Migraine.    Headache not daily versus TIA seen by neurologist.  The EEG was done inconclusive.  No TIA.  May be a seizure.  Gabapentin prescribed 100 mg daily and advised increase to 300 mg daily got tired wondered if it was side effects or suicide ideation from gabapentin patient experienced no suicidal ideations.  Medication list reviewed well-tolerated normal effects no blood in stool or urine no chest pain or shortness of breath.  Med list reviewed well-tolerated.  Accompanied by her daughter.  Sulfa allergy.  Here for a second opinion regarding the safety of gabapentin at low-dose for her with a questionable history of seizures migraines versus TIA.    ROS: A comprehensive review of systems was performed and was otherwise negative    Medications:  Prior to Admission medications    Medication Sig Start Date End Date Taking? Authorizing Provider   aspirin 81 MG EC tablet Take 81 mg by mouth daily. Two daily   Yes PROVIDER, HISTORICAL   cholecalciferol, vitamin D3, (VITAMIN D3) 1,000 unit capsule Take 1,000 Units by mouth daily.   Yes PROVIDER, HISTORICAL   CYANOCOBALAMIN, VITAMIN B-12, (VITAMIN B-12 ORAL) Take by mouth.   Yes PROVIDER, HISTORICAL   gabapentin (NEURONTIN) 100 MG capsule  7/30/18  Yes PROVIDER, HISTORICAL   ranitidine (ZANTAC) 150 MG tablet Bid 8/7/17  Yes PROVIDER, HISTORICAL       Allergies:   Allergies   Allergen Reactions     Sulfa (Sulfonamide Antibiotics) Nausea Only     Adhesive Rash       Immunizations:   Immunization History   Administered Date(s) Administered     DT (pediatric) 01/01/2001     Influenza, inj, historic,unspecified 10/11/2013     Influenza, seasonal,quad inj 6-35 mos 09/14/2010     Pneumo Conj 13-V  (2010&after) 12/20/2016     Pneumo Polysac 23-V 08/04/1999       Exam Chest clear to auscultation and percussion.  Heart tones regular rhythm without murmur rub or gallop.  Abdomen soft nontender no organomegaly.  No peritoneal signs.  Extremities free of edema cyanosis or clubbing.  Neck veins nondistended no thyromegaly or scleral icterus noted, carotids full.  Skin warm and dry easily conversant good spirited.  Normal intelligence.  Neurologically intact no gross localizing findings.  Speech and language function good no pronator drift no leg edema no neck vein distention or thyromegaly.  Carotids are clear bilaterally.  Easily conversant good spirited not distressed petite in size frame stature pleasant intelligent.  Daughter present feels and and very supportive.    Assessment and Plan  Migraines versus seizure or TIA.  Suggest restarting gabapentin and following the advice of the neurologist who prescribed it.  Sulfa allergy return to clinic in 1-2 months as needed.  Wrist pain uncertain etiology no cortisone injection for now not hot or red.  Once hearing evaluation for impaired hearing suggest [facility] Dr. PECK at [phone]    Time: total time spent with the patient was 40 minutes of which >50% was spent in counseling and coordination of care    The following low BMI interventions were performed this visit: weight gain advised    Calin Spangler MD    Patient Active Problem List   Diagnosis     Abdominal Pain     Hyperlipidemia     Constipation     Irritable Bowel Syndrome     Female Stress Incontinence     Limb Pain     Sore Throat     Neck Pain

## 2021-07-23 NOTE — PROGRESS NOTES
[facility]  [address] [address]  Dept: [phone]  Dept Fax: [phone]  Primary Provider: Giselle Argueta MD      PREOPERATIVE EVALUATION:  Today's date: 5/17/2021    Hansa Ly is a 89 y.o. female who presents for a preoperative evaluation.    Surgical Information:  Surgery/Procedure: Dr. Wadsworth with [facility] 7/30/2020:  1.) cystoscopy  2.) Biopsy and fulguration of bladder lesion  3.) Injection of Kenalog into bladder lesion    Surgery Location: [facility]  Surgeon: Dr. Wadsworth  Surgery Date: 05/21/2021  Time of Surgery: 9:30 AM  Where patient plans to recover: At home with family  Fax number for surgical facility: Note does not need to be faxed, will be available electronically in Epic.    Type of Anesthesia Anticipated: to be determined    Assessment & Plan      The proposed surgical procedure is considered LOW risk.    Hansa was seen today for pre-op exam.    Diagnoses and all orders for this visit:    Pre-operative general physical examination, patient is medically optimized for the proposed procedure.  She is on aspirin 81 mg daily.  She has no cardiovascular symptoms with activity.  She is able to walk up stairs, 22 of these, without shortness of breath or chest pain.  Blood pressure is controlled, currently 130/54, heart rate 73.  She may proceed with the proposed investigation.  Patient had an ECG done March 27, 2021.  This showed normal sinus rhythm, 76 bpm.  No ST segment elevation or depression.  No abnormal T wave inversion.  ECG reviewed by me.  MD interval normal.  -     Asymptomatic COVID-19 Virus (CORONAVIRUS) PCR    Interstitial cystitis, Interstitial cystitis since 2008. Would have procedure to help her with the pelvic pain. Cauterize the lesions and give her a shot of kenalog. In 2012, started needing this procedure almost every year and now every 10 months to 12 months. Last time this was done was August 2020.   [facility] urologist, saw her May 17, and said that she had a very angry and red lesion in her bladder, and organized the procedure for May 21, 2021. Procedure is: Cystoscopy with bladder biopsy, fulguration and injection with intralesional Kenalog.    Anemia due to other cause, not classified. Fatigue for a long time time, hemoglobin came back at 10.9,   May 5, 2021, when she established primary care with me.  Patient's daughter says that she was on vitamin B12, but does not like pills.  Previous hemoglobin 12.4 May 13, 2020.  Patient on aspirin 81 mg daily for history of stroke.  Vitamin B12 normal at 786.  IBC normal at 385.  Transferrin normal at 308, and ferritin 21, checked today May 17, 2021.  History of gastric ulcer, had an endoscopy in 2016.  Patient is on omeprazole.  Continues to have abdominal pain, has history of irritable bowel syndrome.  Liver enzymes normal May 5, 2021.   -     Folate, Serum  -     Vitamin B12  -     Transferrin  -     Iron and Transferrin Iron Binding Capacity  -     Ferritin    Chronic left-sided low back pain with left-sided sciatica, and right ankle pain.  Patient patient uses Tylenol for this.    History of stroke, 15 years ago, which affected the right side of her body.  Weakness resolved.  No residual effects from the stroke.     Encounter for screening for other viral diseases  -     Asymptomatic COVID-19 Virus (CORONAVIRUS) PCR    Abnormal finding of blood chemistry, unspecified   -     Transferrin  -     Iron and Transferrin Iron Binding Capacity  -     Ferritin    Risks and Recommendations:  The patient has the following additional risks and recommendations for perioperative complications:  Anemia/Bleeding/Clotting:    - Anemia and does not require treatment prior to surgery. Monitor hemoglobin postoperatively    Medication Instructions:  Omeprazole in the morning of the procedure, otherwise hold all other medications.    RECOMMENDATION:  APPROVAL GIVEN to proceed with  proposed procedure, without further diagnostic evaluation.    40 minutes spent on the date of the encounter doing chart review, review of outside records, review of test results, patient visit, documentation and discussion with family     Subjective     HPI related to upcoming procedure: Interstitial cystitis since 2008. Would have procedure to help her with the pelvic pain. Cauterize the lesions and give her a shot of kenalog. In 2012, started needing this procedure almost every year and now every 10 months to 12 months. Last time this was done was August 2020. Dr. Wadsworth urologist, saw her May 17, and said that she had a very angry and red lesion in her bladder, and organized the procedure for May 21, 2021. Procedure is: Cystoscopy with bladder biopsy, fulguration and injection with intralesional Kenalog.    Had c diff because of all of the antibiotics December 2019.     Just started complaining of pain again, and she communicated with urology. Burning sensation and going to the bathroom with frequency, emptying pretty well, ugly red lesion when Dr. Wadsworth looked at her bladder 5/14/2021.     Preop Questions 5/17/2021   Have you ever had a heart attack or stroke? YES - Had a stroke she says as described below.   Have you ever had surgery on your heart or blood vessels, such as a stent placement, a coronary artery bypass, or surgery on an artery in your head, neck, heart, or legs? No   Do you have chest pain with activity? No   Do you have a history of  heart failure? No   Do you currently have a cold, bronchitis or symptoms of other infection? No   Do you have a cough, shortness of breath, or wheezing? No   Do you or anyone in your family have previous history of blood clots? No   Do you or does anyone in your family have a serious bleeding problem such as prolonged bleeding following surgeries or cuts? No   Have you ever had problems with anemia or been told to take iron pills? Yes, Hb came back at 10.9 when  "checked 5/5/2021   Have you had any abnormal blood loss such as black, tarry or bloody stools, or abnormal vaginal bleeding? No   Have you ever had a blood transfusion? No   Are you willing to have a blood transfusion if it is medically needed before, during, or after your surgery? Yes   Have you or any of your relatives ever had problems with anesthesia? No   Do you have sleep apnea, excessive snoring or daytime drowsiness? No   Do you have any artifical heart valves or other implanted medical devices like a pacemaker, defibrillator, or continuous glucose monitor? No   Do you have artificial joints? No   Are you allergic to latex? No     Health Care Directive:  Patient has a Health Care Directive on file.     I saw the patient to establish primary care, May 5, 2021.  Her daughter has sent me a message, May 14, that she will be having a procedure May 21, and needed a preoperative history and physical.    Dr. Wadsworth with [facility] 7/30/2020:  1.) cystoscopy  2.) Biopsy and fulguration of bladder lesion  3.) Injection of Kenalog into bladder lesion    h/o ulcerative IC with bladder lesions previously managed with Kenalog. Seelaine developed worsening symptoms and was having Kera.    \"she had recurrence of a posterior right bladder wall ulceration which was felt to be contributing to her symptoms. It was recommended to biopsy and fulgurate to confirm benign pathology and hopefully help with symptom control.\"      Primary osteoarthritis of right ankle, this was the patient's main problem when she saw me 5/5/2021.  I discussed avoiding nonsteroidal anti-inflammatories, because of her history of GI ulcer, and gastroesophageal reflux disease,and she is currently on omeprazole 20 mg twice a day.  Discussed with the patient, using Tylenol, extra strength, 1000 mg twice a day as needed for pain.  I have recommended that she take 1000 mg every day in the morning. We did not discuss this today. Patient also uses Voltaren gel. "      Patient has had fatigue for a long time.  Hemoglobin came back at 10.9, MCV normal at 94. Previous hemoglobin 12.4 May 13, 2020.  She has also been on aspirin 81 mg twice a day.  I asked the patient to cut this back to aspirin 81 mg daily.  She is on the aspirin for a previous history of stroke. I will check ferritin, iron studies, vitamin B12 and folate today. Depending on results will decide if other tests are needed.  She has no shortness of breath, no chest pain with activity.     History of gastric ulcer, patient also states she had an endoscopy in 2016.  GERD symptoms are controlled with Omeprazole. She last had a colonoscopy in 2015 and per her daughter, this was normal with no polyps.     Abdominal pain, generalized, continues to have abdominal pain.  Has a history of irritable bowel syndrome, and was on dicyclomine, but is no longer on this.Liver enzymes normal when checked 5/5/2021.    15 years ago she had a stroke, affected her right side. Gave her medication right away, worked really hard with her right hand. Normal gait, back to playing tennis. Speech was not affected. Patient says she had had complete resolution in her weakness related to this.      Results for NARAYAN HINTON (MRN [id number]) as of 5/17/2021 14:06   Ref. Range 5/5/2021 14:25   WBC Latest Ref Range: 4.0 - 11.0 thou/uL 6.6   RBC Latest Ref Range: 3.80 - 5.40 mill/uL 3.58 (L)   Hemoglobin Latest Ref Range: 12.0 - 16.0 g/dL 10.9 (L)   Hematocrit Latest Ref Range: 35.0 - 47.0 % 33.7 (L)   MCV Latest Ref Range: 80 - 100 fL 94   MCH Latest Ref Range: 27.0 - 34.0 pg 30.4   MCHC Latest Ref Range: 32.0 - 36.0 g/dL 32.3   RDW Latest Ref Range: 11.0 - 14.5 % 12.0   Platelets Latest Ref Range: 140 - 440 thou/uL 270     Results for NARAYAN HINTON (MRN [id number]) as of 5/17/2021 14:06   Ref. Range 5/5/2021 14:25   Sodium Latest Ref Range: 136 - 145 mmol/L 140   Potassium Latest Ref Range: 3.5 - 5.0 mmol/L 4.2   Chloride Latest Ref Range: 98 -  107 mmol/L 103   CO2 Latest Ref Range: 22 - 31 mmol/L 26   Anion Gap, Calculation Latest Ref Range: 5 - 18 mmol/L 11   BUN Latest Ref Range: 8 - 28 mg/dL 26   Creatinine Latest Ref Range: 0.60 - 1.10 mg/dL 1.02   GFR MDRD Af Amer Latest Ref Range: >60 mL/min/1.73m2 >60   GFR MDRD Non Af Amer Latest Ref Range: >60 mL/min/1.73m2 51 (L)   Calcium Latest Ref Range: 8.5 - 10.5 mg/dL 9.2   AST Latest Ref Range: 0 - 40 U/L 18   ALT Latest Ref Range: 0 - 45 U/L 11         Review of Systems  CONSTITUTIONAL: NEGATIVE for fever, chills, change in weight  INTEGUMENTARY/SKIN: NEGATIVE for worrisome rashes, moles or lesions  EYES: NEGATIVE for vision changes or irritation  ENT/MOUTH: NEGATIVE for ear, mouth and throat problems  RESP: NEGATIVE for significant cough or SOB  CV: NEGATIVE for chest pain, palpitations or peripheral edema  GI: NEGATIVE for nausea,.  Patient does have intermittent abdominal pain.  She had a colonoscopy in 2015 which did not show any polyps.  She is on Prilosec 20 mg twice a day, if she uses less than less, she has recurrent nausea.  Patient uses Benefiber, every day.  No constipation, no diarrhea.  : NEGATIVE for frequency, dysuria, or hematuria  MUSCULOSKELETAL: No back pain, with left-sided sciatica.  Also right-sided ankle pain.  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 sciatica 12/30/2019     Chronic pain of right ankle 12/30/2019     Hospital discharge follow-up 12/30/2019     Underweight  12/30/2019     Essential tremor 10/10/2019     Healthcare maintenance 10/10/2019     Cough 09/26/2019     Elevated BP without diagnosis of hypertension 09/26/2019     Hammer toes of both feet 09/26/2019     Needs flu shot 09/26/2019     Primary osteoarthritis of right ankle 09/26/2019     Community acquired pneumonia 01/31/2019     Disequilibrium 01/31/2019     History of CVA (cerebrovascular accident) 01/31/2019     History of peptic ulcer disease 01/31/2019     Hyperlipidemia 01/31/2019     Irregular bowel habits 12/20/2016     Change in bowel habits 12/15/2016     Diverticular disease of large intestine 12/15/2016     Diverticulosis of large intestine without perforation or abscess without bleeding 12/15/2016     Pain of upper abdomen 12/02/2016     CTS (carpal tunnel syndrome) 06/17/2015     Sensory disturbance 06/17/2015     Stroke (H) 06/17/2015     Hx of hyperlipidemia      Past Medical History:   Diagnosis Date     Cervical pain (neck)      Chronic interstitial cystitis      Dysuria     stress incontinence     Hyperlipemia      IBS (irritable bowel syndrome)      TIA (transient ischemic attack)      Past Surgical History:   Procedure Laterality Date     APPENDECTOMY 1957.        HYSTERECTOMY, in the late 60's, ? Reason, not cancer.        [place] APPENDECTOMY      Description: Appendectomy;  Recorded: 07/18/2008;     [place] TOTAL ABDOM HYSTERECTOMY      Description: Hysterectomy;  Recorded: 07/18/2008;     [place] TOTAL ABDOM HYSTERECTOMY      Description: Hysterectomy;  Recorded: 07/18/2008;     Current Outpatient Medications   Medication Sig Dispense Refill     acetaminophen (TYLENOL EXTRA STRENGTH) 500 MG tablet Take 2 tablets (1000 mg) every morning, and may take another two tablets later on (at least 4-6 hours later) for pain. 100 tablet 2     aspirin 81 MG EC tablet Take 81 mg by mouth daily.       cholecalciferol, vitamin D3, (VITAMIN D3) 5,000 unit Tab Take 1 tablet by mouth 2 (two) times a day.       diclofenac  sodium (VOLTAREN) 1 % Gel Apply two times a day to three times a day to the right ankle 100 g 2     estradiol (ESTRACE) 0.01 % (0.1 mg/gram) vaginal cream Daily for 2 weeks and then every other day afterwards 42.5 g 1     L.acid/B.bifidum/B.animal/FOS (PROBIOTIC COMPLEX ORAL) Take 1 tablet by mouth 2 (two) times a day.       omeprazole (PRILOSEC) 20 MG capsule Take 1 capsule (20 mg total) by mouth 2 (two) times a day before meals. 180 capsule 3     polyvinyl alcohol (LIQUIFILM TEARS) 1.4 % ophthalmic solution Apply 1 drop to eye as needed for dry eyes.       medium chain triglycerides (MCT OIL ORAL) Take 15 mL by mouth at bedtime.       No current facility-administered medications for this visit.        Allergies   Allergen Reactions     Sulfa (Sulfonamide Antibiotics) Nausea Only     Adhesive Rash       Social History     Tobacco Use     Smoking status: Never Smoker     Smokeless tobacco: Never Used   Substance Use Topics     Alcohol use: Yes     Comment: rare      Family History   Problem Relation Age of Onset     Heart disease Mother      Colon cancer Father      Breast cancer Sister      Stomach cancer Sister      Social History     Substance and Sexual Activity   Drug Use No        Objective     There were no vitals taken for this visit.  Physical Exam    GENERAL APPEARANCE: healthy, alert and no distress     EYES: EOMI, PERRL     HENT: No carotid bruits.     NECK: no adenopathy, no asymmetry, masses, or scars and thyroid normal to palpation     RESP: lungs clear to auscultation - no rales, rhonchi or wheezes     CV: regular rates and rhythm, normal S1 S2, no S3 or S4 and no murmur, click or rub     ABDOMEN:  soft, nontender, no HSM or masses and bowel sounds normal     MS: No swelling or warmth of her joints.     SKIN: no suspicious lesions or rashes     NEURO: Patient is alert and interactive.     PSYCH: mentation appears normal. and affect normal/bright     LYMPHATICS: No cervical adenopathy    Recent Labs    Lab Test 05/05/21  1425 05/13/20  0810   HGB 10.9* 12.4    248    140   K 4.2 4.2   CREATININE 1.02 0.84        PRE-OP Diagnostics:      EKG done 3/22/2021, reviewed by me as above.    REVISED CARDIAC RISK INDEX (RCRI)   The patient has the following serious cardiovascular risks for perioperative complications:   - Cerebrovascular Disease (TIA or CVA) = 1 point    RCRI INTERPRETATION: 1 point: Class II (low risk - 0.9% complication rate)      Signed Electronically by: Giselle Argueta MD    Copy of this evaluation report is provided to requesting physician.

## 2021-10-03 ENCOUNTER — HEALTH MAINTENANCE LETTER (OUTPATIENT)
Age: 86
End: 2021-10-03

## 2021-12-08 ENCOUNTER — NURSE TRIAGE (OUTPATIENT)
Dept: NURSING | Facility: CLINIC | Age: 86
End: 2021-12-08

## 2021-12-08 NOTE — TELEPHONE ENCOUNTER
"Daughter calling. Not with her.    Blood in urine. Urine \"is pure blood x 3 days\".    Go to ER.    Franci Rizo RN  [facility] Nurse Advisor        Reason for Disposition    Urinating more frequently than usual (i.e., frequency)    Blood in the urine is main symptom    Passing pure blood or large blood clots (i.e., larger than a dime or grape)    Additional Information    Negative: Shock suspected (e.g., cold/pale/clammy skin, too weak to stand, low BP, rapid pulse)    Negative: Sounds like a life-threatening emergency to the triager    Negative: Followed a genital area injury    Negative: Followed a genital area injury (penis, scrotum)    Negative: Vaginal discharge    Negative: Pus (white, yellow) or bloody discharge from end of penis    Negative: Discomfort (pain, burning or stinging) when passing urine and pregnant    Negative: Discomfort (pain, burning or stinging) when passing urine and female    Negative: Discomfort (pain, burning or stinging) when passing urine and male    Negative: Pain or itching in the vulvar area    Negative: Pain in scrotum is main symptom    Negative: Symptoms arising from use of a urinary catheter (Min or Coude)    Negative: Unable to urinate (or only a few drops) > 4 hours and bladder feels very full (e.g., palpable bladder or strong urge to urinate)    Negative: Decreased urination and drinking very little and dehydration suspected (e.g., dark urine, no urine > 12 hours, very dry mouth, very lightheaded)    Negative: Patient sounds very sick or weak to the triager    Negative: Fever > 100.4 F (38.0 C)    Negative: Side (flank) or lower back pain present    Negative: Can't control passage of urine (i.e., urinary incontinence) and new onset (< 2 weeks) or worsening    Negative: Shock suspected (e.g., cold/pale/clammy skin, too weak to stand, low BP, rapid pulse)    Negative: Sounds like a life-threatening emergency to the triager    Negative: Urinary catheter, questions " about    Negative: Recent back or abdominal injury    Negative: Recent genital injury    Negative: Unable to urinate (or only a few drops) > 4 hours and bladder feels very full (e.g., palpable bladder or strong urge to urinate)    Protocols used: URINARY SYMPTOMS-A-OH, URINE - BLOOD IN-A-OH

## 2022-01-12 VITALS — HEIGHT: 60 IN | WEIGHT: 97.8 LBS | BODY MASS INDEX: 19.2 KG/M2

## 2022-01-18 VITALS
BODY MASS INDEX: 19.44 KG/M2 | HEART RATE: 70 BPM | OXYGEN SATURATION: 99 % | SYSTOLIC BLOOD PRESSURE: 108 MMHG | DIASTOLIC BLOOD PRESSURE: 44 MMHG | HEIGHT: 60 IN | WEIGHT: 99 LBS

## 2022-01-18 VITALS
DIASTOLIC BLOOD PRESSURE: 58 MMHG | RESPIRATION RATE: 16 BRPM | HEIGHT: 60 IN | WEIGHT: 101 LBS | SYSTOLIC BLOOD PRESSURE: 140 MMHG | TEMPERATURE: 98 F | BODY MASS INDEX: 19.83 KG/M2 | HEART RATE: 80 BPM

## 2022-01-18 VITALS
HEART RATE: 88 BPM | SYSTOLIC BLOOD PRESSURE: 120 MMHG | WEIGHT: 102 LBS | DIASTOLIC BLOOD PRESSURE: 52 MMHG | HEIGHT: 61 IN | OXYGEN SATURATION: 96 % | BODY MASS INDEX: 19.26 KG/M2

## 2022-01-18 VITALS
SYSTOLIC BLOOD PRESSURE: 124 MMHG | DIASTOLIC BLOOD PRESSURE: 66 MMHG | WEIGHT: 94 LBS | HEART RATE: 81 BPM | BODY MASS INDEX: 17.76 KG/M2 | OXYGEN SATURATION: 98 %

## 2022-01-18 VITALS
HEART RATE: 73 BPM | DIASTOLIC BLOOD PRESSURE: 54 MMHG | HEIGHT: 59 IN | SYSTOLIC BLOOD PRESSURE: 130 MMHG | WEIGHT: 101 LBS | BODY MASS INDEX: 20.36 KG/M2 | OXYGEN SATURATION: 99 %

## 2022-01-18 VITALS
OXYGEN SATURATION: 98 % | BODY MASS INDEX: 17.95 KG/M2 | SYSTOLIC BLOOD PRESSURE: 120 MMHG | DIASTOLIC BLOOD PRESSURE: 72 MMHG | WEIGHT: 95 LBS | HEART RATE: 82 BPM

## 2022-01-18 ASSESSMENT — PATIENT HEALTH QUESTIONNAIRE - PHQ9: SUM OF ALL RESPONSES TO PHQ QUESTIONS 1-9: 0

## 2022-06-17 ENCOUNTER — LAB REQUISITION (OUTPATIENT)
Dept: LAB | Facility: CLINIC | Age: 87
End: 2022-06-17

## 2022-06-17 DIAGNOSIS — Z20.828 CONTACT WITH AND (SUSPECTED) EXPOSURE TO OTHER VIRAL COMMUNICABLE DISEASES: ICD-10-CM

## 2022-06-17 LAB — SARS-COV-2 RNA RESP QL NAA+PROBE: NEGATIVE

## 2022-06-17 PROCEDURE — U0003 INFECTIOUS AGENT DETECTION BY NUCLEIC ACID (DNA OR RNA); SEVERE ACUTE RESPIRATORY SYNDROME CORONAVIRUS 2 (SARS-COV-2) (CORONAVIRUS DISEASE [COVID-19]), AMPLIFIED PROBE TECHNIQUE, MAKING USE OF HIGH THROUGHPUT TECHNOLOGIES AS DESCRIBED BY CMS-2020-01-R: HCPCS | Mod: ORL

## 2022-07-10 ENCOUNTER — HEALTH MAINTENANCE LETTER (OUTPATIENT)
Age: 87
End: 2022-07-10

## 2022-07-20 NOTE — TELEPHONE ENCOUNTER
For peroneal tendonitis    
Ok to place orders for PT, or have patient wait for PCP return on Friday? Thank you.    Alsye Restrepo, CMA    
Yes, okay for PT orders, thank you   
[de-identified] : I injected the patient's right knee today with cortisone for primary osteoarthritis.\par \par I discussed at length with the patient the planned steroid and lidocaine injection. The risks, benefits, convalescence and alternatives were reviewed. The possible side effects discussed included but were not limited to: pain, swelling, heat, bleeding, and redness. Symptoms are generally mild but if they are extensive then contact the office. Giving pain relievers by mouth such as NSAIDs or Tylenol can generally treat the reactions to steroid and lidocaine. Rare cases of infection have been noted. Rash, hives and itching may occur post injection. If you have muscle pain or cramps, flushing and or swelling of the face, rapid heart beat, nausea, dizziness, fever, chills, headache, difficulty breathing, swelling in the arms or legs, or have a prickly feeling of your skin, contact a health care provider immediately. Following this discussion, the knee was prepped with Alcohol and under sterile condition the 80 mg Depo-Medrol and 6 cc Lidocaine injection was performed with a 20 gauge needle through a superolateral injection site. The needle was introduced into the joint, aspiration was performed to ensure intra-articular placement and the medication was injected. Upon withdrawal of the needle the site was cleaned with alcohol and a band aid applied. The patient tolerated the injection well and there were no adverse effects. Post injection instructions included no strenuous activity for 24 hours, cryotherapy and if there are any adverse effects to contact the office. 
[de-identified] : I injected the patient's right knee today with cortisone for primary osteoarthritis.\par \par I discussed at length with the patient the planned steroid and lidocaine injection. The risks, benefits, convalescence and alternatives were reviewed. The possible side effects discussed included but were not limited to: pain, swelling, heat, bleeding, and redness. Symptoms are generally mild but if they are extensive then contact the office. Giving pain relievers by mouth such as NSAIDs or Tylenol can generally treat the reactions to steroid and lidocaine. Rare cases of infection have been noted. Rash, hives and itching may occur post injection. If you have muscle pain or cramps, flushing and or swelling of the face, rapid heart beat, nausea, dizziness, fever, chills, headache, difficulty breathing, swelling in the arms or legs, or have a prickly feeling of your skin, contact a health care provider immediately. Following this discussion, the knee was prepped with Alcohol and under sterile condition the 80 mg Depo-Medrol and 6 cc Lidocaine injection was performed with a 20 gauge needle through a superolateral injection site. The needle was introduced into the joint, aspiration was performed to ensure intra-articular placement and the medication was injected. Upon withdrawal of the needle the site was cleaned with alcohol and a band aid applied. The patient tolerated the injection well and there were no adverse effects. Post injection instructions included no strenuous activity for 24 hours, cryotherapy and if there are any adverse effects to contact the office.

## 2022-09-11 ENCOUNTER — HEALTH MAINTENANCE LETTER (OUTPATIENT)
Age: 87
End: 2022-09-11

## 2023-10-07 ENCOUNTER — HEALTH MAINTENANCE LETTER (OUTPATIENT)
Age: 88
End: 2023-10-07

## 2024-01-14 ENCOUNTER — LAB REQUISITION (OUTPATIENT)
Dept: LAB | Facility: CLINIC | Age: 89
End: 2024-01-14
Payer: COMMERCIAL

## 2024-01-14 DIAGNOSIS — N39.0 URINARY TRACT INFECTION, SITE NOT SPECIFIED: ICD-10-CM

## 2024-01-14 PROCEDURE — 87186 SC STD MICRODIL/AGAR DIL: CPT | Mod: ORL | Performed by: NURSE PRACTITIONER

## 2024-01-14 PROCEDURE — 87086 URINE CULTURE/COLONY COUNT: CPT | Mod: ORL | Performed by: NURSE PRACTITIONER

## 2024-01-17 LAB — BACTERIA UR CULT: ABNORMAL

## 2024-03-04 ENCOUNTER — LAB REQUISITION (OUTPATIENT)
Dept: LAB | Facility: CLINIC | Age: 89
End: 2024-03-04
Payer: COMMERCIAL

## 2024-03-04 DIAGNOSIS — R35.0 FREQUENCY OF MICTURITION: ICD-10-CM

## 2024-03-04 LAB
ALBUMIN UR-MCNC: 10 MG/DL
APPEARANCE UR: CLEAR
BILIRUB UR QL STRIP: NEGATIVE
COLOR UR AUTO: YELLOW
GLUCOSE UR STRIP-MCNC: NEGATIVE MG/DL
HGB UR QL STRIP: NEGATIVE
KETONES UR STRIP-MCNC: NEGATIVE MG/DL
LEUKOCYTE ESTERASE UR QL STRIP: NEGATIVE
MUCOUS THREADS #/AREA URNS LPF: PRESENT /LPF
NITRATE UR QL: NEGATIVE
PH UR STRIP: 5 [PH] (ref 5–7)
RBC URINE: 1 /HPF
SP GR UR STRIP: 1.02 (ref 1–1.03)
SQUAMOUS EPITHELIAL: 1 /HPF
TRANSITIONAL EPI: 1 /HPF
UROBILINOGEN UR STRIP-MCNC: NORMAL MG/DL
WBC URINE: 5 /HPF

## 2024-03-04 PROCEDURE — 81001 URINALYSIS AUTO W/SCOPE: CPT | Mod: ORL | Performed by: FAMILY MEDICINE

## 2024-03-11 ENCOUNTER — LAB REQUISITION (OUTPATIENT)
Dept: LAB | Facility: CLINIC | Age: 89
End: 2024-03-11
Payer: COMMERCIAL

## 2024-03-11 DIAGNOSIS — R41.82 ALTERED MENTAL STATUS, UNSPECIFIED: ICD-10-CM

## 2024-03-11 LAB
ALBUMIN UR-MCNC: 30 MG/DL
APPEARANCE UR: ABNORMAL
BACTERIA #/AREA URNS HPF: ABNORMAL /HPF
BILIRUB UR QL STRIP: NEGATIVE
COLOR UR AUTO: YELLOW
GLUCOSE UR STRIP-MCNC: NEGATIVE MG/DL
HGB UR QL STRIP: ABNORMAL
KETONES UR STRIP-MCNC: NEGATIVE MG/DL
LEUKOCYTE ESTERASE UR QL STRIP: ABNORMAL
MUCOUS THREADS #/AREA URNS LPF: PRESENT /LPF
NITRATE UR QL: POSITIVE
PH UR STRIP: 7 [PH] (ref 5–7)
RBC URINE: 1 /HPF
SP GR UR STRIP: 1.02 (ref 1–1.03)
SQUAMOUS EPITHELIAL: 1 /HPF
UROBILINOGEN UR STRIP-MCNC: NORMAL MG/DL
WBC CLUMPS #/AREA URNS HPF: PRESENT /HPF
WBC URINE: >182 /HPF

## 2024-03-11 PROCEDURE — 87186 SC STD MICRODIL/AGAR DIL: CPT | Mod: ORL | Performed by: FAMILY MEDICINE

## 2024-03-11 PROCEDURE — 87086 URINE CULTURE/COLONY COUNT: CPT | Mod: ORL | Performed by: FAMILY MEDICINE

## 2024-03-11 PROCEDURE — 81001 URINALYSIS AUTO W/SCOPE: CPT | Mod: ORL | Performed by: FAMILY MEDICINE

## 2024-03-12 LAB — BACTERIA UR CULT: ABNORMAL
